# Patient Record
Sex: FEMALE | Race: WHITE | NOT HISPANIC OR LATINO | URBAN - METROPOLITAN AREA
[De-identification: names, ages, dates, MRNs, and addresses within clinical notes are randomized per-mention and may not be internally consistent; named-entity substitution may affect disease eponyms.]

---

## 2018-05-18 ENCOUNTER — EMERGENCY (EMERGENCY)
Facility: HOSPITAL | Age: 63
LOS: 1 days | Discharge: HOME | End: 2018-05-18
Admitting: EMERGENCY MEDICINE
Payer: SUBSIDIZED

## 2018-05-18 VITALS
SYSTOLIC BLOOD PRESSURE: 194 MMHG | HEART RATE: 81 BPM | TEMPERATURE: 98 F | DIASTOLIC BLOOD PRESSURE: 86 MMHG | OXYGEN SATURATION: 99 % | RESPIRATION RATE: 20 BRPM

## 2018-05-18 DIAGNOSIS — M71.22 SYNOVIAL CYST OF POPLITEAL SPACE [BAKER], LEFT KNEE: ICD-10-CM

## 2018-05-18 DIAGNOSIS — M25.562 PAIN IN LEFT KNEE: ICD-10-CM

## 2018-05-18 PROCEDURE — 93970 EXTREMITY STUDY: CPT | Mod: 26

## 2018-05-19 VITALS
TEMPERATURE: 98 F | RESPIRATION RATE: 18 BRPM | HEART RATE: 87 BPM | DIASTOLIC BLOOD PRESSURE: 77 MMHG | SYSTOLIC BLOOD PRESSURE: 163 MMHG | OXYGEN SATURATION: 98 %

## 2018-05-19 NOTE — ED PROVIDER NOTE - NS ED ROS FT
Constitutional: no fever, chills, no recent weight loss, change in appetite or malaise  Eyes: no redness/discharge/pain/vision changes  ENT: no rhinorrhea/ear pain/sore throat  Cardiac: No chest pain, SOB or edema.  Respiratory: No cough or respiratory distress  GI: No nausea, vomiting, diarrhea or abdominal pain.  : No dysuria, frequency, urgency or hematuria  MS: no pain to back or other extremities, no loss of ROM, no weakness  Neuro: No headache or weakness. No LOC.  Skin: No skin rash.  Except as documented in the HPI, all other systems are negative.

## 2018-05-19 NOTE — ED PROVIDER NOTE - OBJECTIVE STATEMENT
pt went to UC for eval of atx knee pain, dx with bakers cyst  no imaging performed, not given any ace wrap/knee immobilizer etc  presents for second opinion as pt having pain with weight bearing/ambulation  no other sxs

## 2018-05-19 NOTE — ED PROVIDER NOTE - PROGRESS NOTE DETAILS
Pt aware that we will have official radiology read pending, and may result in change of xray read.  Pt voices understanding of use of medications, instructions for care, and reasons to return or to go to ED  cannot r/o acute fx, although c/w deg changes  Discussed rest, ice, compression, and elevation with patient.   Discussed NSAIDs for anti-inflammatory and pain relief.  Patient advised to f/u with ortho doppler neg

## 2020-09-10 ENCOUNTER — EMERGENCY (EMERGENCY)
Facility: HOSPITAL | Age: 65
LOS: 0 days | Discharge: HOME | End: 2020-09-10
Attending: STUDENT IN AN ORGANIZED HEALTH CARE EDUCATION/TRAINING PROGRAM | Admitting: STUDENT IN AN ORGANIZED HEALTH CARE EDUCATION/TRAINING PROGRAM
Payer: COMMERCIAL

## 2020-09-10 VITALS
TEMPERATURE: 98 F | HEART RATE: 77 BPM | OXYGEN SATURATION: 98 % | SYSTOLIC BLOOD PRESSURE: 141 MMHG | DIASTOLIC BLOOD PRESSURE: 77 MMHG | RESPIRATION RATE: 20 BRPM

## 2020-09-10 PROCEDURE — 99053 MED SERV 10PM-8AM 24 HR FAC: CPT

## 2020-09-10 PROCEDURE — 71046 X-RAY EXAM CHEST 2 VIEWS: CPT | Mod: 26

## 2020-09-10 PROCEDURE — 73030 X-RAY EXAM OF SHOULDER: CPT | Mod: 26,50

## 2020-09-10 PROCEDURE — 99284 EMERGENCY DEPT VISIT MOD MDM: CPT

## 2020-09-10 RX ORDER — METHOCARBAMOL 500 MG/1
1500 TABLET, FILM COATED ORAL ONCE
Refills: 0 | Status: COMPLETED | OUTPATIENT
Start: 2020-09-10 | End: 2020-09-10

## 2020-09-10 RX ORDER — IBUPROFEN 200 MG
600 TABLET ORAL ONCE
Refills: 0 | Status: COMPLETED | OUTPATIENT
Start: 2020-09-10 | End: 2020-09-10

## 2020-09-10 RX ADMIN — METHOCARBAMOL 1500 MILLIGRAM(S): 500 TABLET, FILM COATED ORAL at 13:47

## 2020-09-10 RX ADMIN — Medication 600 MILLIGRAM(S): at 13:48

## 2020-09-10 NOTE — ED PROVIDER NOTE - PHYSICAL EXAMINATION
Constitutional: Well developed, well nourished. NAD  TRAUMA: ABC intact. GCS 15. FAST negative.  Head: Normocephalic, atraumatic.  Eyes: PERRL. EOMI. No Raccoon eyes.   ENT: No nasal discharge. No septal hematoma. No Lauren sign. Mucous membranes moist.  Neck: Supple. Painless ROM. No midline tenderness, stepoffs.  Cardiovascular: Normal S1, S2. Regular rate and rhythm. No murmurs, rubs, or gallops.  Pulmonary: Normal respiratory rate and effort. Lungs clear to auscultation bilaterally. No wheezing, rales, or rhonchi.  CHEST: No chest wall tenderness, crepitus.  Abdominal: Soft. Nondistended. Nontender. No rebound, guarding, rigidity.  BACK: No midline T/L/S tenderness, stepoffs. No saddle paresthesia.  Extremities. Pelvis stable. No traumatic deformities, tenderness of extremities.  Skin: No rashes, cyanosis, lacerations, abrasions.  Neuro: AAOx3. Strength 5/5 in all extremities. Sensation intact throughout. No focal neurological deficits.  Psych: Normal mood. Normal affect.
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
## 2020-09-10 NOTE — ED PROVIDER NOTE - CLINICAL SUMMARY MEDICAL DECISION MAKING FREE TEXT BOX
imaging negative. pt well appearing, FROM to shoulders. str/sensation full to upper extremities. neck supple w/ FROM and no midline ttp. stable for dc w/ supportive care

## 2020-09-10 NOTE — ED PROVIDER NOTE - NS ED ROS FT
Constitutional: No altered mental status.  Eyes: No visual changes.  ENT: No hearing loss.  Neck: +neck pain. no stiffness.  Cardiovascular: No chest pain, palpitations.  Pulmonary: No SOB. No hemoptysis.  Abdominal: No abdominal pain, nausea, vomiting.   : No hematuria.  Neuro: No headache, syncope, dizziness.  MS: No back pain. No deformities.  Psych: No suicidal ideations.

## 2020-09-10 NOTE — ED PROVIDER NOTE - NSFOLLOWUPCLINICS_GEN_ALL_ED_FT
[facility] Concussion Program  Concussion Program  [address] [place] [address]   Phone: [phone]  Fax:   Follow Up Time:

## 2020-09-10 NOTE — ED PROVIDER NOTE - PATIENT PORTAL LINK FT
You can access the FollowMyHealth Patient Portal offered by [facility] by registering at the following website: [email or web address]. By joining [email or web address]’s FollowMyHealth portal, you will also be able to view your health information using other applications (apps) compatible with our system.
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
## 2022-09-09 ENCOUNTER — INPATIENT (INPATIENT)
Facility: HOSPITAL | Age: 67
LOS: 2 days | Discharge: HOME | End: 2022-09-12
Attending: STUDENT IN AN ORGANIZED HEALTH CARE EDUCATION/TRAINING PROGRAM | Admitting: STUDENT IN AN ORGANIZED HEALTH CARE EDUCATION/TRAINING PROGRAM
Payer: MEDICARE

## 2022-09-09 VITALS
RESPIRATION RATE: 18 BRPM | TEMPERATURE: 98 F | WEIGHT: 259.93 LBS | DIASTOLIC BLOOD PRESSURE: 101 MMHG | OXYGEN SATURATION: 99 % | HEART RATE: 91 BPM | SYSTOLIC BLOOD PRESSURE: 183 MMHG

## 2022-09-09 LAB
ALBUMIN SERPL ELPH-MCNC: 4.8 G/DL — SIGNIFICANT CHANGE UP (ref 3.5–5.2)
ALP SERPL-CCNC: 137 U/L — HIGH (ref 30–115)
ALT FLD-CCNC: 31 U/L — SIGNIFICANT CHANGE UP (ref 0–41)
ANION GAP SERPL CALC-SCNC: 12 MMOL/L — SIGNIFICANT CHANGE UP (ref 7–14)
APTT BLD: 32.8 SEC — SIGNIFICANT CHANGE UP (ref 27–39.2)
AST SERPL-CCNC: 26 U/L — SIGNIFICANT CHANGE UP (ref 0–41)
BASOPHILS # BLD AUTO: 0.03 K/UL — SIGNIFICANT CHANGE UP (ref 0–0.2)
BASOPHILS NFR BLD AUTO: 0.5 % — SIGNIFICANT CHANGE UP (ref 0–1)
BILIRUB SERPL-MCNC: 0.6 MG/DL — SIGNIFICANT CHANGE UP (ref 0.2–1.2)
BUN SERPL-MCNC: 10 MG/DL — SIGNIFICANT CHANGE UP (ref 10–20)
CALCIUM SERPL-MCNC: 9.5 MG/DL — SIGNIFICANT CHANGE UP (ref 8.5–10.1)
CHLORIDE SERPL-SCNC: 104 MMOL/L — SIGNIFICANT CHANGE UP (ref 98–110)
CO2 SERPL-SCNC: 24 MMOL/L — SIGNIFICANT CHANGE UP (ref 17–32)
CREAT SERPL-MCNC: 0.6 MG/DL — LOW (ref 0.7–1.5)
EGFR: 99 ML/MIN/1.73M2 — SIGNIFICANT CHANGE UP
EOSINOPHIL # BLD AUTO: 0.06 K/UL — SIGNIFICANT CHANGE UP (ref 0–0.7)
EOSINOPHIL NFR BLD AUTO: 1 % — SIGNIFICANT CHANGE UP (ref 0–8)
GLUCOSE SERPL-MCNC: 108 MG/DL — HIGH (ref 70–99)
HCT VFR BLD CALC: 42 % — SIGNIFICANT CHANGE UP (ref 37–47)
HGB BLD-MCNC: 14.2 G/DL — SIGNIFICANT CHANGE UP (ref 12–16)
IMM GRANULOCYTES NFR BLD AUTO: 0.5 % — HIGH (ref 0.1–0.3)
INR BLD: 0.97 RATIO — SIGNIFICANT CHANGE UP (ref 0.65–1.3)
LYMPHOCYTES # BLD AUTO: 2.86 K/UL — SIGNIFICANT CHANGE UP (ref 1.2–3.4)
LYMPHOCYTES # BLD AUTO: 48.1 % — SIGNIFICANT CHANGE UP (ref 20.5–51.1)
MCHC RBC-ENTMCNC: 29 PG — SIGNIFICANT CHANGE UP (ref 27–31)
MCHC RBC-ENTMCNC: 33.8 G/DL — SIGNIFICANT CHANGE UP (ref 32–37)
MCV RBC AUTO: 85.7 FL — SIGNIFICANT CHANGE UP (ref 81–99)
MONOCYTES # BLD AUTO: 0.52 K/UL — SIGNIFICANT CHANGE UP (ref 0.1–0.6)
MONOCYTES NFR BLD AUTO: 8.8 % — SIGNIFICANT CHANGE UP (ref 1.7–9.3)
NEUTROPHILS # BLD AUTO: 2.44 K/UL — SIGNIFICANT CHANGE UP (ref 1.4–6.5)
NEUTROPHILS NFR BLD AUTO: 41.1 % — LOW (ref 42.2–75.2)
NRBC # BLD: 0 /100 WBCS — SIGNIFICANT CHANGE UP (ref 0–0)
PLATELET # BLD AUTO: 246 K/UL — SIGNIFICANT CHANGE UP (ref 130–400)
POTASSIUM SERPL-MCNC: 4 MMOL/L — SIGNIFICANT CHANGE UP (ref 3.5–5)
POTASSIUM SERPL-SCNC: 4 MMOL/L — SIGNIFICANT CHANGE UP (ref 3.5–5)
PROT SERPL-MCNC: 7.1 G/DL — SIGNIFICANT CHANGE UP (ref 6–8)
PROTHROM AB SERPL-ACNC: 11.2 SEC — SIGNIFICANT CHANGE UP (ref 9.95–12.87)
RBC # BLD: 4.9 M/UL — SIGNIFICANT CHANGE UP (ref 4.2–5.4)
RBC # FLD: 13.2 % — SIGNIFICANT CHANGE UP (ref 11.5–14.5)
SARS-COV-2 RNA SPEC QL NAA+PROBE: SIGNIFICANT CHANGE UP
SODIUM SERPL-SCNC: 140 MMOL/L — SIGNIFICANT CHANGE UP (ref 135–146)
TROPONIN T SERPL-MCNC: <0.01 NG/ML — SIGNIFICANT CHANGE UP
WBC # BLD: 5.94 K/UL — SIGNIFICANT CHANGE UP (ref 4.8–10.8)
WBC # FLD AUTO: 5.94 K/UL — SIGNIFICANT CHANGE UP (ref 4.8–10.8)

## 2022-09-09 PROCEDURE — 0042T: CPT | Mod: MA

## 2022-09-09 PROCEDURE — 93010 ELECTROCARDIOGRAM REPORT: CPT

## 2022-09-09 PROCEDURE — 70450 CT HEAD/BRAIN W/O DYE: CPT | Mod: 26,MA,59

## 2022-09-09 PROCEDURE — 70496 CT ANGIOGRAPHY HEAD: CPT | Mod: 26,MA

## 2022-09-09 PROCEDURE — 70498 CT ANGIOGRAPHY NECK: CPT | Mod: 26,MA

## 2022-09-09 PROCEDURE — 99285 EMERGENCY DEPT VISIT HI MDM: CPT | Mod: GC

## 2022-09-09 RX ORDER — ASPIRIN/CALCIUM CARB/MAGNESIUM 324 MG
325 TABLET ORAL ONCE
Refills: 0 | Status: COMPLETED | OUTPATIENT
Start: 2022-09-09 | End: 2022-09-09

## 2022-09-09 RX ORDER — CLOPIDOGREL BISULFATE 75 MG/1
600 TABLET, FILM COATED ORAL ONCE
Refills: 0 | Status: COMPLETED | OUTPATIENT
Start: 2022-09-09 | End: 2022-09-09

## 2022-09-09 RX ADMIN — Medication 325 MILLIGRAM(S): at 21:03

## 2022-09-09 RX ADMIN — CLOPIDOGREL BISULFATE 600 MILLIGRAM(S): 75 TABLET, FILM COATED ORAL at 21:03

## 2022-09-09 NOTE — ED PROVIDER NOTE - CARE PLAN
1 Principal Discharge DX:	Facial droop due to acute stroke  Secondary Diagnosis:	Atrial fibrillation

## 2022-09-09 NOTE — ED PROVIDER NOTE - OBJECTIVE STATEMENT
left facial droop 11am 67yoF PMHx HTN, noticed left lower facial droop, numbness since 11am while at work. 68 y/o F with PMH hyperthyroidism presenting with left-sided facial droop and numbness to left lower lip since 11am today while at work. Pt states she noticed this because it felt like her lip was being pulled. Denies headache, dizziness, 66 y/o F with PMH hyperthyroidism presenting with left-sided facial droop and numbness to left lower lip since 11am today while at work. Pt states she noticed this because it felt like her lip was being pulled. Denies headache, dizziness, vision changes, chest pain, SOB

## 2022-09-09 NOTE — ED PROVIDER NOTE - CLINICAL SUMMARY MEDICAL DECISION MAKING FREE TEXT BOX
67-year-old female past medical history hypothyroidism presents to the ER for right lower droop that started at 11 AM while at work. NIH 2 on arrival, not a tpa candidate. EKG shows new AF with nvr. Labs and imaging reviewed. Patient updated at the bedside. Admitted to stroke unit for further workup, freq neuro checks.

## 2022-09-09 NOTE — PATIENT PROFILE ADULT - FALL HARM RISK - HARM RISK INTERVENTIONS

## 2022-09-09 NOTE — ED PROVIDER NOTE - ATTENDING CONTRIBUTION TO CARE
67-year-old female past medical history hypothyroidism presents to the ER for right lower droop that started at 11 AM while at work.  She finished her job and came home and symptoms have not resolved. She also reports numbness on her right lower face next to her lower lip.  Denies visual changes, and family denies dysarthria, aphasia.  She denies headache, other focal weakness or numbness.    vs noted, triage note reviewed    CONSTITUTIONAL: Well-developed; well-nourished; in no acute distress. Sitting up and providing appropriate history and physical examination  SKIN: skin exam is warm and dry, no acute rash.  HEAD: Normocephalic; atraumatic.  EYES: PERRL, 3 mm bilateral, no nystagmus, EOM intact; conjunctiva and sclera clear.  ENT: No nasal discharge; airway clear.  NECK: Supple; non tender. + full passive ROM in all directions. No JVD  CARD: S1, S2 normal; no murmurs, gallops, or rubs. Regular rate and rhythm. + Symmetric Strong Pulses  RESP: No wheezes, rales or rhonchi. Good air movement bilaterally  ABD: soft; non-distended; non-tender. No Rebound, No Guarding, No signs of peritonitis, No CVA tenderness. No pulsatile abdominal mass. + Strong and Symmetric Pulses  EXT: Normal ROM. No clubbing, cyanosis or edema. Dp and Pt Pulses intact. Cap refill less than 3 seconds  NEURO: R lower facial droop, + numbness to R lower lip region, normal finger to nose, stable gait, no sensory or motor deficits, Alert, oriented, grossly unremarkable. No Focal deficits. GCS 15. NIH 2  PSYCH: Cooperative, appropriate.    a/p:  R lower facial droop + numbness  HCT, CTA head/neck, CTP  labs, neuro eval  reassess

## 2022-09-09 NOTE — CONSULT NOTE ADULT - SUBJECTIVE AND OBJECTIVE BOX
CC: Left facial droop       HPI:  66 yo RHF with pmhx of Hyperthyroidism , mrankin score of 0 at baseline, p.w c/o left lower facial droop and numbness since 11am today. Patient states that she was at work when she noted the symptom but continued to work and then decided to come to ED now since her symptom persisted. She denies any ear pain, foreign-body sensation in her eye, loss of taste , fever chills, dizziness, speech deficits , dysphagia or focal extremity weakness and no previous h/o stroke.      Home Medications:  Methimazole 10mg q 24    Social History  Denies smoking, alcohol or drug abuse   lives with family        Neuro Exam:  Vital signs: Bp 183/101  HR: 91, Temp 98.5 RR 18  02 Sat 98% on ra,  General: Pleasant female, lying in the bed in no acute distress.  Mental status: A/O X3 . Speech without dysarthria, intact  naming comprehension and reading  Attention: normal concentrating ability.  Language: no difficulty naming common objects, no difficulty repeating a phrase, comprehension intact.   Fund of knowledge: Able to give personal and pmhx  Cranial Nerves: Pupils 3mm ou brisk response to light, EOMI, VFF, Left lower facial weakness, facial sensation is intact bilaterally, forehead looks symmetric at this time, normal eyelid closure and strength, tongue protrudes midline, normal shoulder shrug  Motor: 5/5 throughout             Normal muscle tone and bulk             No Abnormal mvmts   Sensory exam: Intact and symmetric  Coordination:. no dysmetria or limb ataxia  Station: No pronator drift or   Gait: steady     NIHSS: 1  Loc 0  commands 0  questions 0  VF 0  Gaze 0  Face 1  RUE 0  RLE 0  LUE 0  LLE 0  Sensory 0  Speech 0  Language 0  Ataxia 0  Extinction 0    mRankin score 0  0 No symptoms at all  1 No significant disability despite symptoms; able to carry out all usual duties and activities without assistance  2 Slight disability; unable to carry out all previous activities, but able to look after own affairs  3 Moderate disability; requiring some help, but able to walk without assistance  4 Moderately severe disability; unable to walk without assistance and unable to attend to own bodily needs without assistance  5 Severe disability; bedridden, incontinent and requiring constant nursing care and attention  6 Dead      Allergies    No Known Allergies    Intolerances      MEDICATIONS  (STANDING):    MEDICATIONS  (PRN):      LABS:                        14.2   5.94  )-----------( 246      ( 09 Sep 2022 19:44 )             42.0     09-09    140  |  104  |  10  ----------------------------<  108<H>  4.0   |  24  |  0.6<L>    Ca    9.5      09 Sep 2022 19:44    TPro  7.1  /  Alb  4.8  /  TBili  0.6  /  DBili  x   /  AST  26  /  ALT  31  /  AlkPhos  137<H>  09-09    `      Neuro Imaging:  `< from: CT Brain Stroke Protocol (09.09.22 @ 19:19) >  FINDINGS:    There is prominence of the bilateral ventricles, sylvian fissures, and   sulci, compatible with mild diffuse parenchymal volume loss.    There are patchy hypodensities in the periventricular white matter,   likely representing mild chronic microvascular ischemic changes.    There is no evidence of acute territorial infarct or intracranial   hemorrhage. There is no space-occupying lesion or midline shift.    There is no evidence of hydrocephalus. There are no extra-axial fluid   collections.    The visualized intraorbital contents are normal. Near complete   opacification of the left maxillary sinus.The mastoid air cells are   aerated. The visualized soft tissues and osseous structures appear normal.      IMPRESSION:    No evidence of acute intracranial pathology.    Mild chronic microvascular ischemic changes.    Preliminary findings were discussed with Marci Cook on 9/9/2022 at   7:35 PM.    --- End of Report ---    MARC GERARDO MD; Resident Radiologist  This document has been electronically signed.  LIBBY BURRELL MD; Attending Radiologist  This document has been electronically signed. Sep  9 2022  7:52PM    < end of copied text >          < from: CT Angio Neck Stroke Protocol w/ IV Cont (09.09.22 @ 19:43) >  IMPRESSION:    CT PERFUSION:  No perfusion deficits to suggest areas of completed infarction or at risk   territory.      CTA HEAD/NECK:  No large vessel occlusion or vascular malformation.    Incidental inferiorly projecting 2 mm vascular outpouching in the right   carotid terminus could reflect a vascular infundibulum versus aneurysm.    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    --- End of Report ---    LIBBY BURRELL MD; Attending Radiologist  This document has been electronically signed. Sep  9 2022  8:01PM    < end of copied text >        EEG:     Echo:   Carotid Doppler: N/A  Telemetry:

## 2022-09-09 NOTE — ED PROVIDER NOTE - PHYSICAL EXAMINATION
CONSTITUTIONAL: awake, sitting in stretcher in no acute distress  SKIN: Warm, dry  HEAD: Normocephalic; atraumatic  EYES: No conjunctival injection. EOMI. PERRL  ENT: No nasal discharge; oropharynx nonerythematous; airway clear  NECK: Supple; non tender, no lymphadenopathy  CARD:  Regular rate and rhythm; S1, S2 normal; no murmurs, gallops, or rubs  RESP: CTAB; No wheezes, crackles, rales or rhonchi  ABD: Soft, nontender, nondistended, nonrigid, no guarding or rebound tenderness  EXT: Normal ROM,  no edema, good radial pulse  NEURO: AOx3, speaking in full sentences with mildly slurred speech, right-sided facial droop, peripheral vision intact, No pronator drift. Strength and sensation intact and symmetric in all extremities. Finger to nose and heel to shin WNL. CONSTITUTIONAL: awake, sitting in stretcher in no acute distress  SKIN: Warm, dry  HEAD: Normocephalic; atraumatic  EYES: No conjunctival injection. EOMI. PERRL  ENT: No nasal discharge; oropharynx nonerythematous; airway clear  NECK: Supple; non tender, no lymphadenopathy  CARD:  Regular rate and rhythm; S1, S2 normal; no murmurs, gallops, or rubs  RESP: CTAB; No wheezes, crackles, rales or rhonchi  ABD: Soft, nontender, nondistended, nonrigid, no guarding or rebound tenderness  EXT: Normal ROM,  no edema, good radial pulse  NEURO: AOx3, speaking in full sentences with mildly slurred speech, left-sided facial droop, does not involve forehead, peripheral vision intact, No pronator drift. Strength and sensation intact and symmetric in all extremities; decreased sensation at left lower lip. Finger to nose and heel to shin WNL.

## 2022-09-09 NOTE — ED PROVIDER NOTE - NS ED ROS FT
Review of Systems:  CONSTITUTIONAL: (-)fever, (-)chills  SKIN: (-)rash  EYES: (-) eye pain, (-) vision changes  ENT: (-) rhinorrhea, (-) congestion, (-) sore throat  RESPIRATORY: (-) shortness of breath, (-) cough  CARDIAC: (-) chest pain, (-) palpitations  GI: (-) abdominal pain, (-) nausea, (-) vomiting, (-) diarrhea, (-) constipation, (-) melena (-) hematochezia  : (-) dysuria, (-) frequency, (-) hematuria  NEUROLOGIC: (-) numbness or tingling, (-) focal weakness, (-) headache, (-) dizziness  All other systems negative, unless specified in HPI

## 2022-09-09 NOTE — ED PROVIDER NOTE - PROGRESS NOTE DETAILS
JOHNNY -- EKG shows Atrial fibrillation at 93bpm. Patient and family deny any prior diagnosis of Afib, patient has never seen a cardiologist. Call placed to cardiology, pending call back.

## 2022-09-09 NOTE — CONSULT NOTE ADULT - ASSESSMENT
68 yo RHF with pmhx of Hyperthyroidism , mrankin score of 0 at baseline, p.w c/o left lower facial droop and numbness since 11am today.  She denies any ear pain, foreign-body sensation in her eye, loss of taste , fever chills, dizziness, speech deficits , dysphagia or focal extremity weakness and no previous h/o stroke.  NIHSS 1 for left facial asymmetry. /101  FS `108. ED team discussed the case with tele stroke attending on call Dr Gabriel Boudreaux. CTH is negative for acute intracranial findings /bleed. Patient is currently out of the therapeutic window for IV Thrombolysis. Brain Vessel imaging is negative for LVO or perfusion deficit, but has an incidental finding of  2 mm vascular outpouching in the right carotid terminus could be a vascular infundibulum vs aneurysm.       Plan  ·	Admit patient to stroke unit to Dr Kari Bradshaw  ·	Continuos Telemonitoring  ·	NIHSS and vital signs q 4 hrs  ·	Administer Asa 325mg po x1 now  ·	Start asa 81 mg q 24  ·	Start Lipitor 80 mg q 24  ·	Obtain MRI brain n/c   ·	Check lipid profile, hbaic, EKG  ·	ECHO with bubble study  ·	Dysphagia screen (keep npo until patient passes the test)  ·	DVT prophylaxis  ·	Neuro attending note will follow     68 yo RHF with pmhx of Hyperthyroidism , mrankin score of 0 at baseline, p.w c/o left lower facial droop and numbness since 11am today.  She denies any ear pain, foreign-body sensation in her eye, loss of taste , fever chills, dizziness, speech deficits , dysphagia or focal extremity weakness and no previous h/o stroke.  NIHSS 1 for left facial asymmetry. /101  FS `108. ED team discussed the case with tele stroke attending on call Dr Gabriel Boudreaux. CTH is negative for acute intracranial findings /bleed. Patient is currently out of the therapeutic window for IV Thrombolysis. Brain Vessel imaging is negative for LVO or perfusion deficit, but has an incidental finding of  2 mm vascular outpouching in the right carotid terminus could be a vascular infundibulum vs aneurysm.       Plan  ·	Admit patient to stroke unit to Dr Kari Bradshaw  ·	Continuos Telemonitoring  ·	NIHSS and vital signs q 4 hrs  ·	Administer Asa 325mg po x1 now  ·	Start asa 81 mg q 24  ·	Start Lipitor 80 mg q 24  ·	Obtain MRI brain n/c   ·	Check lipid profile, hbaic, EKG  ·	ECHO with bubble study  ·	Cardio evaluation for newonset afib  ·	DASH diet (patient passed dysphagia screen)  ·	DVT prophylaxis  ·	Neuro attending note will follow     68 yo RHF with pmhx of Hyperthyroidism , mrankin score of 0 at baseline, p.w c/o left lower facial droop and numbness since 11am today.  She denies any ear pain, foreign-body sensation in her eye, loss of taste , fever chills, dizziness, speech deficits , dysphagia or focal extremity weakness and no previous h/o stroke.  NIHSS 1 for left facial asymmetry. /101  FS `108. ED team discussed the case with tele stroke attending on call Dr Gabriel Boudreaux. CTH is negative for acute intracranial findings /bleed. Patient is currently out of the therapeutic window for IV Thrombolysis. Brain Vessel imaging is negative for LVO or perfusion deficit, but has an incidental finding of  2 mm vascular outpouching in the right carotid terminus could be a vascular infundibulum vs aneurysm.       Plan  ·	Admit patient to stroke unit to Dr Kari Bradshaw  ·	Continuos Telemonitoring  ·	NIHSS and vital signs q 4 hrs  ·	Permissive HTN for now  ·	Administer Asa 325mg po x1 now  ·	Start asa 81 mg q 24  ·	Start Lipitor 80 mg q 24  ·	Obtain MRI brain n/c   ·	Check lipid profile, hbaic, EKG  ·	ECHO with bubble study  ·	Cardio evaluation for newonset afib  ·	DASH diet (patient passed dysphagia screen)  ·	DVT prophylaxis  ·	Neuro attending note will follow     68 yo RHF with pmhx of Hyperthyroidism , mrankin score of 0 at baseline, p.w c/o left lower facial droop and numbness since 11am today.  She denies any ear pain, foreign-body sensation in her eye, loss of taste , fever chills, dizziness, speech deficits , dysphagia or focal extremity weakness and no previous h/o stroke.  NIHSS 1 for left facial asymmetry. /101  FS `108. ED team discussed the case with tele stroke attending on call Dr Gabriel Boudreaux. CTH is negative for acute intracranial findings /bleed. Patient is currently out of the therapeutic window for IV Thrombolysis. Brain Vessel imaging is negative for LVO or perfusion deficit, but has an incidental finding of  2 mm vascular outpouching in the right carotid terminus could be a vascular infundibulum vs aneurysm.       Plan  ·	Admit patient to stroke unit to Dr Kari Bradshaw  ·	Continuos Telemonitoring  ·	NIHSS and vital signs q 4 hrs  ·	Keep sbp 140-180  ·	Administer Asa 325mg + Plavix 75mg po x1   ·	Start Lipitor 80 mg q 24  ·	Obtain MRI brain n/c   ·	Check lipid profile, hbaic, EKG  ·	ECHO with bubble study  ·	Cardio evaluation for new onset afib  ·	DASH diet (patient passed dysphagia screen)  ·	DVT prophylaxis  ·	Neuro attending note will follow     68 yo RHF with pmhx of Hyperthyroidism , mrankin score of 0 at baseline, p.w c/o left lower facial droop and numbness since 11am today.  She denies any ear pain, foreign-body sensation in her eye, loss of taste , fever chills, dizziness, speech deficits , dysphagia or focal extremity weakness and no previous h/o stroke.  NIHSS 1 for left facial asymmetry. /101  FS `108. ED team discussed the case with tele stroke attending on call Dr Gabriel Boudreaux. CTH is negative for acute intracranial findings /bleed. Patient is currently out of the therapeutic window for IV Thrombolysis. Brain Vessel imaging is negative for LVO or perfusion deficit, but has an incidental finding of  2 mm vascular outpouching in the right carotid terminus could be a vascular infundibulum vs aneurysm.       Plan  ·	Admit patient to stroke unit to Dr Kari Bradshaw  ·	Continuos Telemonitoring  ·	NIHSS and vital signs q 4 hrs  ·	Keep sbp 120-160  ·	Administer Asa 325mg + Plavix 75mg po x1 (completed in ed)  ·	Start Lipitor 80 mg q 24  ·	Obtain MRI brain n/c   ·	Check lipid profile, hbaic, EKG  ·	ECHO with bubble study  ·	Cardio evaluation for new onset afib  ·	DASH diet (patient passed dysphagia screen)  ·	DVT prophylaxis  ·	Neuro attending note will follow

## 2022-09-10 LAB
A1C WITH ESTIMATED AVERAGE GLUCOSE RESULT: 6.2 % — HIGH (ref 4–5.6)
ALBUMIN SERPL ELPH-MCNC: 4.2 G/DL — SIGNIFICANT CHANGE UP (ref 3.5–5.2)
ALP SERPL-CCNC: 125 U/L — HIGH (ref 30–115)
ALT FLD-CCNC: 27 U/L — SIGNIFICANT CHANGE UP (ref 0–41)
ANION GAP SERPL CALC-SCNC: 13 MMOL/L — SIGNIFICANT CHANGE UP (ref 7–14)
APTT BLD: 45.2 SEC — HIGH (ref 27–39.2)
APTT BLD: 46.3 SEC — HIGH (ref 27–39.2)
APTT BLD: 49.3 SEC — HIGH (ref 27–39.2)
APTT BLD: 58.5 SEC — HIGH (ref 27–39.2)
AST SERPL-CCNC: 25 U/L — SIGNIFICANT CHANGE UP (ref 0–41)
BILIRUB SERPL-MCNC: 0.9 MG/DL — SIGNIFICANT CHANGE UP (ref 0.2–1.2)
BUN SERPL-MCNC: 9 MG/DL — LOW (ref 10–20)
CALCIUM SERPL-MCNC: 9.2 MG/DL — SIGNIFICANT CHANGE UP (ref 8.5–10.1)
CHLORIDE SERPL-SCNC: 106 MMOL/L — SIGNIFICANT CHANGE UP (ref 98–110)
CHOLEST SERPL-MCNC: 201 MG/DL — HIGH
CO2 SERPL-SCNC: 21 MMOL/L — SIGNIFICANT CHANGE UP (ref 17–32)
CREAT SERPL-MCNC: 0.5 MG/DL — LOW (ref 0.7–1.5)
EGFR: 103 ML/MIN/1.73M2 — SIGNIFICANT CHANGE UP
ESTIMATED AVERAGE GLUCOSE: 131 MG/DL — HIGH (ref 68–114)
GLUCOSE SERPL-MCNC: 124 MG/DL — HIGH (ref 70–99)
HCT VFR BLD CALC: 39.3 % — SIGNIFICANT CHANGE UP (ref 37–47)
HCT VFR BLD CALC: 43.8 % — SIGNIFICANT CHANGE UP (ref 37–47)
HDLC SERPL-MCNC: 44 MG/DL — LOW
HGB BLD-MCNC: 13.6 G/DL — SIGNIFICANT CHANGE UP (ref 12–16)
HGB BLD-MCNC: 14.7 G/DL — SIGNIFICANT CHANGE UP (ref 12–16)
INR BLD: 0.97 RATIO — SIGNIFICANT CHANGE UP (ref 0.65–1.3)
LIPID PNL WITH DIRECT LDL SERPL: 132 MG/DL — HIGH
MAGNESIUM SERPL-MCNC: 2 MG/DL — SIGNIFICANT CHANGE UP (ref 1.8–2.4)
MCHC RBC-ENTMCNC: 28.8 PG — SIGNIFICANT CHANGE UP (ref 27–31)
MCHC RBC-ENTMCNC: 29.4 PG — SIGNIFICANT CHANGE UP (ref 27–31)
MCHC RBC-ENTMCNC: 33.6 G/DL — SIGNIFICANT CHANGE UP (ref 32–37)
MCHC RBC-ENTMCNC: 34.6 G/DL — SIGNIFICANT CHANGE UP (ref 32–37)
MCV RBC AUTO: 85.1 FL — SIGNIFICANT CHANGE UP (ref 81–99)
MCV RBC AUTO: 85.7 FL — SIGNIFICANT CHANGE UP (ref 81–99)
NON HDL CHOLESTEROL: 157 MG/DL — HIGH
NRBC # BLD: 0 /100 WBCS — SIGNIFICANT CHANGE UP (ref 0–0)
NRBC # BLD: 0 /100 WBCS — SIGNIFICANT CHANGE UP (ref 0–0)
PHOSPHATE SERPL-MCNC: 3.8 MG/DL — SIGNIFICANT CHANGE UP (ref 2.1–4.9)
PLATELET # BLD AUTO: 230 K/UL — SIGNIFICANT CHANGE UP (ref 130–400)
PLATELET # BLD AUTO: 248 K/UL — SIGNIFICANT CHANGE UP (ref 130–400)
POTASSIUM SERPL-MCNC: 4.2 MMOL/L — SIGNIFICANT CHANGE UP (ref 3.5–5)
POTASSIUM SERPL-SCNC: 4.2 MMOL/L — SIGNIFICANT CHANGE UP (ref 3.5–5)
PROT SERPL-MCNC: 6.4 G/DL — SIGNIFICANT CHANGE UP (ref 6–8)
PROTHROM AB SERPL-ACNC: 11.2 SEC — SIGNIFICANT CHANGE UP (ref 9.95–12.87)
RBC # BLD: 4.62 M/UL — SIGNIFICANT CHANGE UP (ref 4.2–5.4)
RBC # BLD: 5.11 M/UL — SIGNIFICANT CHANGE UP (ref 4.2–5.4)
RBC # FLD: 13.1 % — SIGNIFICANT CHANGE UP (ref 11.5–14.5)
RBC # FLD: 13.1 % — SIGNIFICANT CHANGE UP (ref 11.5–14.5)
SODIUM SERPL-SCNC: 140 MMOL/L — SIGNIFICANT CHANGE UP (ref 135–146)
TRIGL SERPL-MCNC: 126 MG/DL — SIGNIFICANT CHANGE UP
TSH SERPL-MCNC: 5.46 UIU/ML — HIGH (ref 0.27–4.2)
WBC # BLD: 5.21 K/UL — SIGNIFICANT CHANGE UP (ref 4.8–10.8)
WBC # BLD: 5.35 K/UL — SIGNIFICANT CHANGE UP (ref 4.8–10.8)
WBC # FLD AUTO: 5.21 K/UL — SIGNIFICANT CHANGE UP (ref 4.8–10.8)
WBC # FLD AUTO: 5.35 K/UL — SIGNIFICANT CHANGE UP (ref 4.8–10.8)

## 2022-09-10 PROCEDURE — 99221 1ST HOSP IP/OBS SF/LOW 40: CPT

## 2022-09-10 PROCEDURE — 99222 1ST HOSP IP/OBS MODERATE 55: CPT

## 2022-09-10 RX ORDER — HEPARIN SODIUM 5000 [USP'U]/ML
INJECTION INTRAVENOUS; SUBCUTANEOUS
Qty: 25000 | Refills: 0 | Status: DISCONTINUED | OUTPATIENT
Start: 2022-09-10 | End: 2022-09-10

## 2022-09-10 RX ORDER — METHIMAZOLE 10 MG/1
1 TABLET ORAL
Qty: 0 | Refills: 0 | DISCHARGE

## 2022-09-10 RX ORDER — ASPIRIN/CALCIUM CARB/MAGNESIUM 324 MG
81 TABLET ORAL DAILY
Refills: 0 | Status: DISCONTINUED | OUTPATIENT
Start: 2022-09-10 | End: 2022-09-10

## 2022-09-10 RX ORDER — METOPROLOL TARTRATE 50 MG
2.5 TABLET ORAL EVERY 4 HOURS
Refills: 0 | Status: DISCONTINUED | OUTPATIENT
Start: 2022-09-10 | End: 2022-09-12

## 2022-09-10 RX ORDER — PANTOPRAZOLE SODIUM 20 MG/1
40 TABLET, DELAYED RELEASE ORAL
Refills: 0 | Status: DISCONTINUED | OUTPATIENT
Start: 2022-09-10 | End: 2022-09-12

## 2022-09-10 RX ORDER — CLOPIDOGREL BISULFATE 75 MG/1
75 TABLET, FILM COATED ORAL DAILY
Refills: 0 | Status: DISCONTINUED | OUTPATIENT
Start: 2022-09-10 | End: 2022-09-10

## 2022-09-10 RX ORDER — ATORVASTATIN CALCIUM 80 MG/1
80 TABLET, FILM COATED ORAL AT BEDTIME
Refills: 0 | Status: DISCONTINUED | OUTPATIENT
Start: 2022-09-10 | End: 2022-09-12

## 2022-09-10 RX ORDER — ACETAMINOPHEN 500 MG
650 TABLET ORAL EVERY 6 HOURS
Refills: 0 | Status: DISCONTINUED | OUTPATIENT
Start: 2022-09-10 | End: 2022-09-12

## 2022-09-10 RX ORDER — HEPARIN SODIUM 5000 [USP'U]/ML
1000 INJECTION INTRAVENOUS; SUBCUTANEOUS
Qty: 25000 | Refills: 0 | Status: DISCONTINUED | OUTPATIENT
Start: 2022-09-10 | End: 2022-09-12

## 2022-09-10 RX ORDER — LANOLIN ALCOHOL/MO/W.PET/CERES
3 CREAM (GRAM) TOPICAL AT BEDTIME
Refills: 0 | Status: DISCONTINUED | OUTPATIENT
Start: 2022-09-10 | End: 2022-09-12

## 2022-09-10 RX ORDER — ONDANSETRON 8 MG/1
4 TABLET, FILM COATED ORAL EVERY 8 HOURS
Refills: 0 | Status: DISCONTINUED | OUTPATIENT
Start: 2022-09-10 | End: 2022-09-12

## 2022-09-10 RX ADMIN — PANTOPRAZOLE SODIUM 40 MILLIGRAM(S): 20 TABLET, DELAYED RELEASE ORAL at 06:04

## 2022-09-10 RX ADMIN — ATORVASTATIN CALCIUM 80 MILLIGRAM(S): 80 TABLET, FILM COATED ORAL at 21:17

## 2022-09-10 RX ADMIN — HEPARIN SODIUM 1200 UNIT(S)/HR: 5000 INJECTION INTRAVENOUS; SUBCUTANEOUS at 04:44

## 2022-09-10 NOTE — H&P ADULT - ASSESSMENT
68 yo RHF with pmhx of Hyperthyroidism , mrankin score of 0 at baseline, presents with left lower facial droop and numbness since 11am today that occurred at work.     r/o stroke   - monitor on tele  - neuro check q 4Telemonitoring  - permissive htn Keep sbp 140-180  s/p Asa 325mg + Plavix 75mg po x1   - start on Lipitor 80 mg q 24  - continue aspiring and plavix   - order MRI brain n/c   - Check lipid profile, hbaic, EKG  - check ECHO with bubble study  - PT/OT    #New onset afib   - check thyroid panel and if abnormal may need readjustment of hyperthyroid medication  - check echo   - start heparin drip for anticoagulation   - May need SHELLEY/CV     #hyperthyroid  - continue methimazole      # GI PPx - Protonix  # DVT PPx - hep gtt   # Activity -  Increase as Tolerated  # Dispo -   Patient to be discharged when medically optimized.  # Code Status - FULL     66 yo RHF with pmhx of Hyperthyroidism , mrankin score of 0 at baseline, presents with left lower facial droop and numbness since 11am today that occurred at work.     r/o stroke   - monitor on tele  - neuro check q 4Telemonitoring  - permissive htn Keep sbp 140-180  s/p Asa 325mg + Plavix 75mg po x1   - start on Lipitor 80 mg q 24  - continue aspirin  - order MRI brain n/c   - Check lipid profile, hbaic, EKG  - check ECHO with bubble study  - PT/OT    #New onset afib   - check thyroid panel and if abnormal may need readjustment of hyperthyroid medication  - check echo   - start heparin drip for anticoagulation   - May need SHELLEY/CV     #hyperthyroid  - continue methimazole      # GI PPx - Protonix  # DVT PPx - hep gtt   # Activity -  Increase as Tolerated  # Dispo -   Patient to be discharged when medically optimized.  # Code Status - FULL     66 yo RHF with pmhx of Hyperthyroidism , mrankin score of 0 at baseline, presents with left lower facial droop and numbness since 11am today that occurred at work.     r/o stroke   - monitor on tele  - neuro check q 4Telemonitoring  - permissive htn Keep sbp 140-180  s/p Asa 325mg + Plavix 75mg po x1   - start on Lipitor 80 mg q 24  - started on hep gtt for stroke protocol  - order MRI brain n/c   - Check lipid profile, hbaic, EKG  - check ECHO with bubble study  - PT/OT    #New onset afib   - check thyroid panel and if abnormal may need readjustment of hyperthyroid medication  - check echo   - start heparin drip for anticoagulation   - May need SHELLEY/CV     #hyperthyroid  - continue methimazole      # GI PPx - Protonix  # DVT PPx - hep gtt   # Activity -  Increase as Tolerated  # Dispo -   Patient to be discharged when medically optimized.  # Code Status - FULL

## 2022-09-10 NOTE — PROGRESS NOTE ADULT - NS ATTEST RISK PROBLEM GEN_ALL_CORE FT
66 yo RHF with pmhx of Hyperthyroidism presented left lower facial droop and numbness. Brain Vessel imaging is negative for LVO or perfusion deficit and was out of tpa window. Incidentally found to have 2 mm vascular outpouching in the right carotid terminus could be a vascular infundibulum vs aneurysm. Found to have Afib on EKG, started on heparin full ac.     c/w neuro checks as above  MR brain non con  heparin for afib, rate control, echo  statin  tfts

## 2022-09-10 NOTE — PHYSICAL THERAPY INITIAL EVALUATION ADULT - GENERAL OBSERVATIONS, REHAB EVAL
8:55-9:25. chart reviewed. Pt received sitting at B/S EOB, alert, oriented, able to follow multi-step instructions and agreeable to PT evaluation.  + IV heparin, + monitoring, L side facial palsy, sensation intact, coordination intact, motor/balance WFL. No motor deficits was noted. Pt is independent with overall bed mobility and transfer, able to ambulate without AD, Pt is not candidate for PT at this time further PT referral PRN. 8:55-9:25. chart reviewed. Pt received sitting at B/S EOB, alert, oriented, able to follow multi-step instructions and agreeable to PT evaluation.  + IV heparin, + monitoring, L side complete facial palsy, sensation intact, coordination intact, motor/balance WFL. No motor deficits was noted. Pt is independent with overall bed mobility and transfer, able to ambulate without AD, Pt is not candidate for PT at this time further PT referral PRN.

## 2022-09-10 NOTE — CONSULT NOTE ADULT - ASSESSMENT
IMPRESSION  New Onset A Fib CHADSVASC 4  Left Sided Facial Droop upper motor neuron pattern  Admitted to Stroke Unit for CVA  Hyperthyroidism  DM    RECOMMENDATIONS  check 2 D echo  check TSH if abnormal consider endocrine eval for hyperthyroidism management  can start heparin drip for anticoagulation if neuro clears and if risk of hemorrhagic conversion of CVA is low  can switch to eliquis 5mg BID if tolerating heparin drip  aspirin 81 and lipitor 80 for CVA  check Lipid profile, HbA1c IMPRESSION  New Onset A Fib CHADSVASC 4  Left Sided Facial Droop upper motor neuron pattern  Admitted to Stroke Unit for CVA  Hyperthyroidism  DM    RECOMMENDATIONS  check 2 D echo  check TSH if abnormal consider endocrine eval for hyperthyroidism management  can start heparin drip for anticoagulation if neuro clears and if risk of hemorrhagic conversion of CVA is low  can switch to eliquis 5mg BID if tolerating heparin drip  aspirin 81 and lipitor 80 for CVA  check Lipid profile, HbA1c  May need SHELLEY/CV  IMPRESSION  New Onset A Fib CHADSVASC 4  Left Sided Facial Droop upper motor neuron pattern  Admitted to Stroke Unit for CVA  Hyperthyroidism  DM    RECOMMENDATIONS  check 2 D echo  check TSH if abnormal consider endocrine eval for hyperthyroidism management  can start heparin drip for anticoagulation if neuro clears and if risk of hemorrhagic conversion of CVA is low  can switch to eliquis 5mg BID if tolerating heparin drip  start metoprolol 12.5mg BID for rate control for now  aspirin 81 and lipitor 80 for CVA  check Lipid profile, HbA1c  May need SHELLEY/CV

## 2022-09-10 NOTE — H&P ADULT - HISTORY OF PRESENT ILLNESS
66 yo RHF with pmhx of Hyperthyroidism , mrankin score of 0 at baseline, presents with left lower facial droop and numbness since 11am today that occurred at work. She denies any ear pain, foreign-body sensation in her eye, loss of taste , fever chills, dizziness, speech deficits , dysphagia or focal extremity weakness and no previous h/o stroke.  Pt does endorse hx of palpitations that started today. EKG in the ED showed new onset afib

## 2022-09-10 NOTE — CONSULT NOTE ADULT - SUBJECTIVE AND OBJECTIVE BOX
HPI:  66 y/o F with PMHx of Hyperthyroidism on methimazole presented to the hospital for left sided facial droop with associated numbness. Pt was also found to have incidental finding of A Fib. Cardio consulted for A FIb.     Pt does endorse hx of palpitations that started today. Denied hx of chest pain, lightheadedness or dizziness. Never seen a cardiologist, denied prior hx of A FIb. No hx of bleeding.     PAST MEDICAL & SURGICAL HISTORY  No pertinent past medical history        FAMILY HISTORY:  FAMILY HISTORY:      SOCIAL HISTORY:  Social History:      ALLERGIES:  No Known Allergies      MEDICATIONS:  methimazole 10 milliGRAM(s) Oral daily    PRN:      HOME MEDICATIONS:  Home Medications:      VITALS:   T(F): 97.6 (09-09 @ 23:00), Max: 98.5 (09-09 @ 18:59)  HR: 82 (09-09 @ 23:55) (78 - 91)  BP: 178/104 (09-09 @ 23:55) (178/99 - 183/101)  BP(mean): 123 (09-09 @ 23:55) (123 - 123)  RR: 18 (09-09 @ 23:55) (18 - 18)  SpO2: 99% (09-09 @ 23:55) (99% - 99%)    I&O's Summary      REVIEW OF SYSTEMS:  CONSTITUTIONAL: see HPI  HEENT: No visual changes, neck/ear pain  RESPIRATORY: No cough, sob  CARDIOVASCULAR: See HPI  GASTROINTESTINAL: No abdominal pain. No nausea, vomiting, diarrhea   GENITOURINARY: No dysuria, frequency or hematuria  NEUROLOGICAL: Lt facial droop  SKIN: No new rashes    PHYSICAL EXAM:  General: Not in distress.     HEENT: EOMI  Cardio: Irregular, S1, S2, no murmur  Pulm: B/L BS.  No wheezing / crackles / rales  Abdomen: Soft, non-tender, non-distended. Normoactive bowel sounds  Extremities: No edema b/l le  Neuro: A&O x3. Left facial droop    LABS:                        14.2   5.94  )-----------( 246      ( 09 Sep 2022 19:44 )             42.0     09-09    140  |  104  |  10  ----------------------------<  108<H>  4.0   |  24  |  0.6<L>    Ca    9.5      09 Sep 2022 19:44    TPro  7.1  /  Alb  4.8  /  TBili  0.6  /  DBili  x   /  AST  26  /  ALT  31  /  AlkPhos  137<H>  09-09    PT/INR - ( 09 Sep 2022 19:44 )   PT: 11.20 sec;   INR: 0.97 ratio         PTT - ( 09 Sep 2022 19:44 )  PTT:32.8 sec  Troponin T, Serum: <0.01 ng/mL (09-09-22 @ 19:44)    CARDIAC MARKERS ( 09 Sep 2022 19:44 )  x     / <0.01 ng/mL / x     / x     / x            Troponin trend:        COVID-19 PCR: NotDetec (09 Sep 2022 20:13)      RADIOLOGY:  -CXR: < from: Xray Chest 2 Views PA/Lat (09.10.20 @ 14:58) >  Impression:    No radiographic evidence of acute cardiopulmonary disease.        < end of copied text >    -TTE: N/A  -STRESS TEST: N/A  -CATHETERIZATION: N/A  -OTHER:  ECG:  A FIb    TELEMETRY EVENTS: A Fib

## 2022-09-10 NOTE — PHYSICAL THERAPY INITIAL EVALUATION ADULT - LIVES WITH, PROFILE
Normocephalic. Eyes:      Extraocular Movements: Extraocular movements intact. Pupils: Pupils are equal, round, and reactive to light. Cardiovascular:      Rate and Rhythm: Normal rate. Pulmonary:      Effort: Pulmonary effort is normal. No respiratory distress. Abdominal:      General: Abdomen is flat. There is no distension. Tenderness: There is no abdominal tenderness. There is no guarding. Musculoskeletal:         General: Normal range of motion. Cervical back: Neck supple. Skin:     General: Skin is warm. Neurological:      Mental Status: He is alert. Assessment:   Hx perforated duodenal ulcer from H pylori  S/p julisa  Patch     Plan:   EGD today     I have discussed the risks, benefits, and alternatives to esophagogastroduodenoscopy with possible biopsy with deep sedation with the patient. I have detailed the risks of deep sedation (hypotension, hypoxia) as well as complications of bleeding and perforation.   The patient understands the above and agrees to proceed      Physician Signature: Trever Leach MD      Send copy of H&P to [address] [place] [address] DO Linda
DC instructions
in a PH using 10 steps to enter and 5 indoors/spouse

## 2022-09-10 NOTE — H&P ADULT - NSHPLABSRESULTS_GEN_ALL_CORE
LABS:  cret                        14.2   5.94  )-----------( 246      ( 09 Sep 2022 19:44 )             42.0     09-09    140  |  104  |  10  ----------------------------<  108<H>  4.0   |  24  |  0.6<L>    Ca    9.5      09 Sep 2022 19:44    TPro  7.1  /  Alb  4.8  /  TBili  0.6  /  DBili  x   /  AST  26  /  ALT  31  /  AlkPhos  137<H>  09-09    PT/INR - ( 09 Sep 2022 19:44 )   PT: 11.20 sec;   INR: 0.97 ratio         PTT - ( 09 Sep 2022 19:44 )  PTT:32.8 sec

## 2022-09-10 NOTE — PHYSICAL THERAPY INITIAL EVALUATION ADULT - PHYSICAL ASSIST/NONPHYSICAL ASSIST: GAIT, REHAB EVAL
[facility] Follow Up Note     Date: 5/11/2021 / Time: 2:43 PM    Patient ID:   Name:             Martha Ordonez   YOB: 1945  Age:                 75 y.o.  female   MRN:               [id number]      Thank you for requesting a sleep medicine consultation on Martha Ordonez at the sleep center. She presents today with the chief complaints of sleep apnea follow up.     HISTORY OF PRESENT ILLNESS:       Pt is currently on CPAP 6 cm with O2 bleed in at 2L/min. She was previously on ASV however she was unable to tolerate it. The patient denied significant change in medical or sleep history. She is getting about 8 hrs of sleep on a good night. The bad nights are rare per week.  Her mother passed away last Wednesday and since then her sleep is slightly more disturbed than usual.  However she has a good support system.  Overall,  She does finds her sleep refreshing.However she does take regular naps. The naps are usually 2 hrs long. She denies any symptoms of RLS, narcolepsy or any symptoms to suggest parasomnias such as nightmares, sleep walking or acting out of dreams.She is using CPAP most days of the week. Pt reports 8 hrs of average nightly use of CPAP. Pt denies snoring, gasping,choking.Pt also denies significant mask leak that is interfering with sleep. The 30 day compliance was downloaded which shows adequate compliance with more that 4 hr usage about 100%. The AHI is has improved to 6.4/hr. The mask leak is normal. The symptoms of  sleep apnea are under control with the current therapy.  Her last ECHO was on 5/25/20 which showed EF of 70% however last RVSP was from 9/9/19 which was 30 mmHg.    SLEEP HISTORY   A titration polysomnogram on June 24, 2019 demonstrated treatment emergent central apnea episodes on CPAP and BiPAP.  A follow-up titration polysomnogram on August 6, 2019 suggested a good response to ASV with expiratory pressures of 7 to 15 cm of water     PSG titration from  5/27/20 indicated severe obstructive sleep apnea with AHI of 78.7/hr and O2 kwadwo 76 %. Due to severity of the disease she met the split study protocol. The titration started with CPAP 6 cm and the best tolerated was CPAP 6 cm. The AHI improved to 0.82/hr with improved O2 kwadwo of 84% and average O2 saturation of 94%.      OPO on CPAP at 6 cmH2O from 6/23/20 indicated the lowest oxygen to be 84 % and she spent 6% of the recording time below 90%.  Technically the need for oxygen is determined based on time spent below 89%, therefore the % time below 90% saturation can not determine the true need for oxygen bleed in. Based on the OPO, oxygen bleed is not needed.           REVIEW OF SYSTEMS:       Constitutional: Denies fevers, Denies weight changes  Eyes: Denies changes in vision, no eye pain  Ears/Nose/Throat/Mouth: Denies nasal congestion or sore throat   Cardiovascular: Denies chest pain or palpitations   Respiratory: Denies shortness of breath , Denies cough  Gastrointestinal/Hepatic: Denies abdominal pain, nausea, vomiting, diarrhea, constipation or GI bleeding   Genitourinary: Deniesdysuria or frequency  Musculoskeletal/Rheum: Denies  joint pain and swelling   Skin/Breast: Denies rash,   Neurological: Denies headache, confusion, memory loss or focal weakness/parasthesias  Psychiatric: denies mood disorder   Sleep: denies snoring and EDS    Comprehensive review of systems form is reviewed with the patient and is attached in the EMR.     PMH:  has a past medical history of Abnormal thyroid function test (9/10/2014), Allergic rhinitis, Anxiety, Asthma, Back pain, Basal cell carcinoma, Bronchitis, Cardiac arrhythmia, Depression, Elevated liver enzymes (9/10/2014), Family history of melanoma, Fatigue (12/31/2015), GERD (gastroesophageal reflux disease), Glaucoma suspect of both eyes, Hyperlipidemia, Hypertension, Increased PTH level (5/4/2016), Nasal drainage, OCD (obsessive compulsive disorder), Pancreatitis,  "Parathyroid disorder (HCC), PCOS (polycystic ovarian syndrome), S/P ERCP, and Transient global amnesia.  MEDS:   Current Outpatient Medications:   •  methocarbamol (ROBAXIN) 500 MG Tab, Take 1-2 Tablets by mouth 2 times a day as needed., Disp: 60 tablet, Rfl: 0  •  losartan (COZAAR) 50 MG Tab, Take 1 tablet by mouth 2 times a day., Disp: 180 tablet, Rfl: 1  •  Multiple Vitamins-Minerals (PRESERVISION AREDS 2 PO), Take  by mouth., Disp: , Rfl:   •  omeprazole (PRILOSEC) 10 MG CAPSULE DELAYED RELEASE, TAKE 1 CAPSULE BY MOUTH TWICE A DAY, Disp: 180 capsule, Rfl: 0  •  sertraline (ZOLOFT) 25 MG tablet, TAKE 1-2 TABS BY MOUTH EVERY DAY. GRADUALLY INCREASE TO 2 TABS AS DAY AS TOLERATED. (Patient taking differently: Take 25 mg by mouth every day. Gradually increase to 2 tabs as day as tolerated.), Disp: 180 Tab, Rfl: 1  •  GLUCOSAMINE-CHONDROITIN PO, Take  by mouth., Disp: , Rfl:   •  VENTOLIN  (90 Base) MCG/ACT Aero Soln inhalation aerosol, INHALE 2 PUFFS BY MOUTH EVERY 6 HOURS AS NEEDED, Disp: 18 Each, Rfl: 3  •  AZASITE 1 % ophthalmic solution, , Disp: , Rfl:   •  Polyvinyl Alcohol-Povidone (REFRESH OP), Place 1 Drop in both eyes 4 times a day as needed., Disp: , Rfl:   •  Cholecalciferol (VITAMIN D) 2000 UNIT Tab, Take 2,000 Units by mouth every day., Disp: , Rfl:   ALLERGIES:   Allergies   Allergen Reactions   • Ace Inhibitors      Cough   • Ceclor [Cefaclor] Hives   • Ceftin Hives   • Cephalosporins Rash   • Ciprofloxacin Rash   • Clarithromycin      Arrhythmias. Able to take other -mycins.    • Kenalog Hives   • Lamisil [Terbinafine Hcl]    • Latex Rash   • Merthiolate [Thimerosal] Hives   • Monistat [Tioconazole] Itching   • Pcn [Penicillins] Hives   • Simvastatin      Myalgia   • Trazodone Hcl      \"wired\", \"felt really hot\"   • Benzalkonium Chloride Rash   • Tetracyclines Vomiting     SURGHX:   Past Surgical History:   Procedure Laterality Date   • ABDOMINAL HYSTERECTOMY TOTAL      ovaries remain   • " "ADENOIDECTOMY     • CARDIAC CATH, RIGHT HEART      normal, EKG was Abnormal    • CHOLECYSTECTOMY     • DILATION AND CURETTAGE     • EYE SURGERY      b/l iridotomy   • HYSTERECTOMY LAPAROSCOPY     • LUMPECTOMY      benign   • TONSILLECTOMY     • US-NEEDLE CORE BX-BREAST PANEL       SOCHX:  reports that she has never smoked. She has never used smokeless tobacco. She reports that she does not drink alcohol and does not use drugs..  FH:   Family History   Problem Relation Age of Onset   • Hypertension Mother    • Heart Disease Father         CHF   • Cancer Father         melanoma   • Hypertension Sister    • Stroke Sister    • Sleep Apnea Sister    • Cancer Paternal Grandfather         colon   • Heart Disease Paternal Grandfather    • Stroke Paternal Grandfather    • Diabetes Paternal Grandfather    • Other Maternal Grandmother         eplepsey   • Heart Attack Maternal Grandfather    • Stroke Maternal Grandfather    • Stroke Paternal Grandmother    • Sleep Apnea Sister          Physical Exam:  Vitals/ General Appearance:   Weight/BMI: Body mass index is 28.62 kg/m².  /72 (BP Location: Right arm, Patient Position: Sitting, BP Cuff Size: Adult)   Pulse 66   Resp 16   Ht 1.651 m (5' 5\")   Wt 78 kg (172 lb)   SpO2 95%   Vitals:    05/11/21 1447   BP: 112/72   BP Location: Right arm   Patient Position: Sitting   BP Cuff Size: Adult   Pulse: 66   Resp: 16   SpO2: 95%   Weight: 78 kg (172 lb)   Height: 1.651 m (5' 5\")       Pt. is alert and oriented to time, place and person. Cooperative and in no apparent distress.       Constitutional: Alert, no distress, well-groomed.  Skin: No rashes in visible areas.  Eye: Round. Conjunctiva clear, lids normal. No icterus.   ENMT: Lips pink without lesions, good dentition, moist mucous membranes. Phonation normal.  Neck: No masses, no thyromegaly. Moves freely without pain.  CV: Pulse as reported by patient  Respiratory: Unlabored respiratory effort, no cough or audible " wheeze  Psych: Alert and oriented x3, normal affect and mood.     ASSESSMENT AND PLAN     1. Sleep Apnea :The pathophysiology of sleep anea and the increased risk of cardiovascular morbidity from untreated sleep apnea is discussed in detail with the patient.    She is urged to avoid supine sleep, weight gain and alcoholic beverages since all of these can worsen sleep apnea. She is cautioned against drowsy driving. If She feels sleepy while driving, She must pull over for a break/nap, rather than persist on the road, in the interest of She own safety and that of others on the road.   Plan   - Continue CPAP 6 cm with vitera FFM   -The supplies are refilled   - compliance download was reviewed and discussed with the pt   - compliance was reinforced     2. Hx of pulmonary HTN: currently on nocturnal oxygen. ECHO is ordered today per pt's request.     3.  Acute insomnia due to grief.  She will be flying to [facility].  Short prescription of Ambien 5 mg with 15 tabs is given for the travel and her recent loss causing sleep disturbance.    Regarding treatment of other past medical problems and general health maintenance,  She is urged to follow up with PCP.     supervision

## 2022-09-10 NOTE — CHART NOTE - NSCHARTNOTEFT_GEN_A_CORE
Patient noted to have new onset afib . Cardiology was consulted and they recommend heparin. Discussed with neuroattending on call Dr sarah painter. Ok for heparin GTT stroke protocol. PTT Goal 45-55.

## 2022-09-10 NOTE — PROGRESS NOTE ADULT - SUBJECTIVE AND OBJECTIVE BOX
Neurology Progress Note    Interval History:  The patient was seen and examined at the bedside. CT showed R carotid aneusysm 2mm vs. infundibulum. Was started on Heparin for Afib  Subj: Feels well. Denied headache, blurry vision. Still with tingling of L lower mouth.      PAST MEDICAL & SURGICAL HISTORY:  No pertinent past medical history        Medications:  acetaminophen     Tablet .. 650 milliGRAM(s) Oral every 6 hours PRN  aluminum hydroxide/magnesium hydroxide/simethicone Suspension 30 milliLiter(s) Oral every 4 hours PRN  atorvastatin 80 milliGRAM(s) Oral at bedtime  heparin  Infusion. 1000 Unit(s)/Hr IV Continuous <Continuous>  melatonin 3 milliGRAM(s) Oral at bedtime PRN  methimazole 10 milliGRAM(s) Oral daily  ondansetron Injectable 4 milliGRAM(s) IV Push every 8 hours PRN  pantoprazole    Tablet 40 milliGRAM(s) Oral before breakfast      Vital Signs Last 24 Hrs  T(C): 36.3 (10 Sep 2022 11:30), Max: 36.9 (09 Sep 2022 18:59)  T(F): 97.3 (10 Sep 2022 11:30), Max: 98.5 (09 Sep 2022 18:59)  HR: 92 (10 Sep 2022 11:30) (74 - 105)  BP: 155/95 (10 Sep 2022 11:30) (120/74 - 183/101)  BP(mean): 129 (10 Sep 2022 11:30) (95 - 150)  RR: 18 (10 Sep 2022 11:30) (16 - 18)  SpO2: 99% (10 Sep 2022 11:30) (99% - 99%)    Parameters below as of 10 Sep 2022 11:30  Patient On (Oxygen Delivery Method): room air      NEURO EXAM:  General: Pleasant female sitting at edge of bed with food tray  Mental status: AAOx3. Intact speech and naming(Hungarian speaking, son at bedside).  Attention: normal concentrating ability.  Cranial Nerves: Pupils 3mm. EOMI, VFF, Difficulty closing L eye fully, L facial droop with decreased sensation to LT.  Tongue protrudes midline, normal shoulder shrug  Motor: 5/5 UE 5/5 LE. No pronator drift    Sensory exam: Intact and symmetric to light touch  Coordination:. No dysmetria on FTN. No dysdiadokinesia  Station:   Gait: Narrow    NIHSS: 2 (sensory and facial droop)    Labs:  CBC Full  -  ( 10 Sep 2022 11:10 )  WBC Count : 5.21 K/uL  RBC Count : 5.11 M/uL  Hemoglobin : 14.7 g/dL  Hematocrit : 43.8 %  Platelet Count - Automated : 248 K/uL  Mean Cell Volume : 85.7 fL  Mean Cell Hemoglobin : 28.8 pg  Mean Cell Hemoglobin Concentration : 33.6 g/dL  Auto Neutrophil # : x  Auto Lymphocyte # : x  Auto Monocyte # : x  Auto Eosinophil # : x  Auto Basophil # : x  Auto Neutrophil % : x  Auto Lymphocyte % : x  Auto Monocyte % : x  Auto Eosinophil % : x  Auto Basophil % : x    09-10    140  |  106  |  9<L>  ----------------------------<  124<H>  4.2   |  21  |  0.5<L>    Ca    9.2      10 Sep 2022 06:11  Phos  3.8     09-10  Mg     2.0     09-10    TPro  6.4  /  Alb  4.2  /  TBili  0.9  /  DBili  x   /  AST  25  /  ALT  27  /  AlkPhos  125<H>  09-10    LIVER FUNCTIONS - ( 10 Sep 2022 06:11 )  Alb: 4.2 g/dL / Pro: 6.4 g/dL / ALK PHOS: 125 U/L / ALT: 27 U/L / AST: 25 U/L / GGT: x           PT/INR - ( 09 Sep 2022 19:44 )   PT: 11.20 sec;   INR: 0.97 ratio         PTT - ( 10 Sep 2022 11:13 )  PTT:49.3 sec      Assessment:  This is a 67y Female w/ h/o     Plan:

## 2022-09-10 NOTE — H&P ADULT - NSHPPHYSICALEXAM_GEN_ALL_CORE
ICU Vital Signs Last 24 Hrs  T(C): 36.4 (09 Sep 2022 23:00), Max: 36.9 (09 Sep 2022 18:59)  T(F): 97.6 (09 Sep 2022 23:00), Max: 98.5 (09 Sep 2022 18:59)  HR: 82 (09 Sep 2022 23:55) (78 - 91)  BP: 178/104 (09 Sep 2022 23:55) (178/99 - 183/101)  BP(mean): 123 (09 Sep 2022 23:55) (123 - 123)  RR: 18 (09 Sep 2022 23:55) (18 - 18)  SpO2: 99% (09 Sep 2022 23:55) (99% - 99%)    O2 Parameters below as of 09 Sep 2022 23:55  Patient On (Oxygen Delivery Method): room air    GENERAL: NAD, lying in bed comfortably  CHEST/LUNG: Clear to auscultation bilaterally; No rales, rhonchi, wheezing, or rubs. Unlabored respirations  HEART: Regular rate and rhythm; No murmurs, rubs, or gallops  ABDOMEN: Bowel sounds present; Soft, Nontender, Nondistended.   EXTREMITIES:  2+ Peripheral Pulses, brisk capillary refill. No clubbing, cyanosis, or edema  NERVOUS SYSTEM:  A/O X3  no dysarthria. EOMI. PERRLA Left lower facial weakness.   Facial sensation in tact. Motor strenght intact. no drift

## 2022-09-10 NOTE — H&P ADULT - ATTENDING COMMENTS
66 yo RHF with pmhx of Hyperthyroidism , mrankin score of 0 at baseline, presents with left lower facial droop and numbness since 11am today that occurred at work.     r/o stroke   - monitor on tele  - neuro check q 4Telemonitoring  - permissive htn Keep sbp 140-180  s/p Asa 325mg + Plavix 75mg po x1   - start on Lipitor 80 mg q 24  - started on hep gtt for stroke protocol  - order MRI brain n/c   - Check lipid profile, hbaic, EKG  - check ECHO with bubble study  - PT/OT    #New onset afib   - check thyroid panel and if abnormal may need readjustment of hyperthyroid medication  - check echo   - start heparin drip for anticoagulation   - May need SHELLEY/CV     #hyperthyroid  - continue methimazole      # GI PPx - Protonix  # DVT PPx - hep gtt   # Activity -  Increase as Tolerated  # Dispo -   Patient to be discharged when medically optimized.  # Code Status - FULL

## 2022-09-10 NOTE — PROGRESS NOTE ADULT - ASSESSMENT
66 yo RHF with pmhx of Hyperthyroidism presented left lower facial droop and numbness. Brain Vessel imaging is negative for LVO or perfusion deficit and was out of tpa window. Incidentally found to have 2 mm vascular outpouching in the right carotid terminus could be a vascular infundibulum vs aneurysm. Fount to have Afib on EKG, started on heparin full ac.     Neuro  #r/o stroke: may be embolic since evidence of Afib.  - monitor on tele  - neuro check q 4Telemonitoring  - SBp goal 140-180  - s/p Asa 325mg + Plavix 75mg po x1   - start on Lipitor 80 mg q 24  - started on hep gtt for stroke protocol  - Pending MRI brain n/c   - A1C 6.2  - F/u lipid profile  - check ECHO with bubble study  - PT/OT    #New onset afib   - TSH was elevated  - Heparin gtt  - check echo   - May need SHELLEY/CV     #hyperthyroid  - continue methimazole  - May need to adjust      # GI PPx - Protonix  # DVT PPx - hep gtt   # Activity -  Increase as Tolerated  # Dispo -   Patient to be discharged when medically optimized.  # Code Status - FULL     68 yo RHF with pmhx of Hyperthyroidism presented left lower facial droop and numbness. Brain Vessel imaging is negative for LVO or perfusion deficit and was out of tpa window. Incidentally found to have 2 mm vascular outpouching in the right carotid terminus could be a vascular infundibulum vs aneurysm. Fount to have Afib on EKG, started on heparin full ac.     Neuro  #r/o stroke: may be embolic since evidence of Afib.  - monitor on tele  - neuro check q 4Telemonitoring  - SBp goal 140-180  - s/p Asa 325mg + Plavix 75mg po x1   - start on Lipitor 80 mg q 24  - started on hep gtt for stroke protocol  - Pending MRI brain n/c   - A1C 6.2  - F/u lipid profile  - check ECHO with bubble study  - PT/OT    #New onset afib   - TSH was elevated  - Heparin gtt (45-55 goal PTT)  - check echo   - May need SHELLEY/CV     #hyperthyroid  - continue methimazole  - May need to adjust      # GI PPx - Protonix  # DVT PPx - hep gtt   # Activity -  Increase as Tolerated  # Dispo -   Patient to be discharged when medically optimized.  # Code Status - FULL     66 yo RHF with pmhx of Hyperthyroidism presented left lower facial droop and numbness. Brain Vessel imaging is negative for LVO or perfusion deficit and was out of tpa window. Incidentally found to have 2 mm vascular outpouching in the right carotid terminus could be a vascular infundibulum vs aneurysm. Fount to have Afib on EKG, started on heparin full ac.     Neuro  #r/o stroke: may be embolic since evidence of Afib.  - monitor on tele  - neuro check q 4Telemonitoring  - SBp goal 140-180  - s/p Asa 325mg + Plavix 75mg po x1   - start on Lipitor 80 mg q 24  - started on hep gtt for stroke protocol  - Pending MRI brain n/c   - A1C 6.2  - F/u lipid profile  - check ECHO with bubble study  - PT/OT  nonurgent neuroendovascular consult for r/o aneurysm    #New onset afib   - TSH was elevated  - Heparin gtt (45-55 goal PTT)  - check echo   - May need SHELLEY/CV     #hyperthyroid  - continue methimazole  - May need to adjust      # GI PPx - Protonix  # DVT PPx - hep gtt   # Activity -  Increase as Tolerated  # Dispo -   Patient to be discharged when medically optimized.  # Code Status - FULL     68 yo RHF with pmhx of Hyperthyroidism presented left lower facial droop and numbness. Brain Vessel imaging is negative for LVO or perfusion deficit and was out of tpa window. Incidentally found to have 2 mm vascular outpouching in the right carotid terminus could be a vascular infundibulum vs aneurysm. Fount to have Afib on EKG, started on heparin full ac.     Neuro  #r/o stroke: may be embolic since evidence of Afib.  - monitor on tele  - neuro check q 4Telemonitoring  - SBp goal 140-180  - s/p Asa 325mg + Plavix 75mg po x1   - start on Lipitor 80 mg q 24  - started on hep gtt for stroke protocol  - Pending MRI brain n/c   - A1C 6.2  - F/u lipid profile  - check ECHO with bubble study  - PT/OT  nonurgent neuroendovascular consult for r/o aneurysm    #New onset afib   - TSH was elevated  - F/u T4  - Heparin gtt (45-55 goal PTT)  - Lopressor 2.5 q4h prn for RVR >120  - check echo   - May need SHELLEY/CV     #hyperthyroid  - continue methimazole  - F/u T4      # GI PPx - None  # DVT PPx - hep gtt   # Activity -  Increase as Tolerated  # Dispo -   Patient to be discharged when medically optimized.  # Code Status - FULL

## 2022-09-10 NOTE — CONSULT NOTE ADULT - ATTENDING COMMENTS
Patient seen and examined. Pertinent labs, imaging and telemetry reviewed. I agree with the above:     Patient still with left facial droop. Discussed case with patient and son.   Still remains in AF on monitor, though rate controlled.   No history of AF in past.   Patient is pending further stroke work up with MRI.   New onset AF likely cause of stroke and symptoms.   -Continue with heparin infusion and if no signs of bleeding, can switch to eliquis 5mg PO BID.   -Start metoprolol tartrate 12.5mg PO BID for rate control.   -Check TTE.   -Continue with ASA and atorvastatin.  -Check lipids, HA1c and TSH.   -Hx of hyperthyroid, which may contribute to AF.   -Once neurologically stable, may benefit from SHELLEY/DCCV next week to attempt NSR.     Discussed with patient, family and fellow.

## 2022-09-11 LAB
ALBUMIN SERPL ELPH-MCNC: 4.4 G/DL — SIGNIFICANT CHANGE UP (ref 3.5–5.2)
ALP SERPL-CCNC: 127 U/L — HIGH (ref 30–115)
ALT FLD-CCNC: 28 U/L — SIGNIFICANT CHANGE UP (ref 0–41)
ANION GAP SERPL CALC-SCNC: 13 MMOL/L — SIGNIFICANT CHANGE UP (ref 7–14)
APTT BLD: 39.3 SEC — HIGH (ref 27–39.2)
APTT BLD: 42.8 SEC — HIGH (ref 27–39.2)
APTT BLD: 46.3 SEC — HIGH (ref 27–39.2)
APTT BLD: 50.3 SEC — HIGH (ref 27–39.2)
AST SERPL-CCNC: 25 U/L — SIGNIFICANT CHANGE UP (ref 0–41)
BASOPHILS # BLD AUTO: 0.02 K/UL — SIGNIFICANT CHANGE UP (ref 0–0.2)
BASOPHILS NFR BLD AUTO: 0.4 % — SIGNIFICANT CHANGE UP (ref 0–1)
BILIRUB SERPL-MCNC: 1 MG/DL — SIGNIFICANT CHANGE UP (ref 0.2–1.2)
BLD GP AB SCN SERPL QL: SIGNIFICANT CHANGE UP
BUN SERPL-MCNC: 11 MG/DL — SIGNIFICANT CHANGE UP (ref 10–20)
CALCIUM SERPL-MCNC: 9.3 MG/DL — SIGNIFICANT CHANGE UP (ref 8.5–10.1)
CHLORIDE SERPL-SCNC: 104 MMOL/L — SIGNIFICANT CHANGE UP (ref 98–110)
CHOLEST SERPL-MCNC: 203 MG/DL — HIGH
CO2 SERPL-SCNC: 24 MMOL/L — SIGNIFICANT CHANGE UP (ref 17–32)
CREAT SERPL-MCNC: 0.6 MG/DL — LOW (ref 0.7–1.5)
EGFR: 99 ML/MIN/1.73M2 — SIGNIFICANT CHANGE UP
EOSINOPHIL # BLD AUTO: 0.07 K/UL — SIGNIFICANT CHANGE UP (ref 0–0.7)
EOSINOPHIL NFR BLD AUTO: 1.5 % — SIGNIFICANT CHANGE UP (ref 0–8)
GLUCOSE SERPL-MCNC: 129 MG/DL — HIGH (ref 70–99)
HCT VFR BLD CALC: 41.7 % — SIGNIFICANT CHANGE UP (ref 37–47)
HDLC SERPL-MCNC: 48 MG/DL — LOW
HGB BLD-MCNC: 14.2 G/DL — SIGNIFICANT CHANGE UP (ref 12–16)
IMM GRANULOCYTES NFR BLD AUTO: 0.2 % — SIGNIFICANT CHANGE UP (ref 0.1–0.3)
LIPID PNL WITH DIRECT LDL SERPL: 118 MG/DL — HIGH
LYMPHOCYTES # BLD AUTO: 2.05 K/UL — SIGNIFICANT CHANGE UP (ref 1.2–3.4)
LYMPHOCYTES # BLD AUTO: 43.3 % — SIGNIFICANT CHANGE UP (ref 20.5–51.1)
MCHC RBC-ENTMCNC: 28.9 PG — SIGNIFICANT CHANGE UP (ref 27–31)
MCHC RBC-ENTMCNC: 34.1 G/DL — SIGNIFICANT CHANGE UP (ref 32–37)
MCV RBC AUTO: 84.8 FL — SIGNIFICANT CHANGE UP (ref 81–99)
MONOCYTES # BLD AUTO: 0.41 K/UL — SIGNIFICANT CHANGE UP (ref 0.1–0.6)
MONOCYTES NFR BLD AUTO: 8.7 % — SIGNIFICANT CHANGE UP (ref 1.7–9.3)
NEUTROPHILS # BLD AUTO: 2.17 K/UL — SIGNIFICANT CHANGE UP (ref 1.4–6.5)
NEUTROPHILS NFR BLD AUTO: 45.9 % — SIGNIFICANT CHANGE UP (ref 42.2–75.2)
NON HDL CHOLESTEROL: 155 MG/DL — HIGH
NRBC # BLD: 0 /100 WBCS — SIGNIFICANT CHANGE UP (ref 0–0)
PLATELET # BLD AUTO: 245 K/UL — SIGNIFICANT CHANGE UP (ref 130–400)
POTASSIUM SERPL-MCNC: 4.2 MMOL/L — SIGNIFICANT CHANGE UP (ref 3.5–5)
POTASSIUM SERPL-SCNC: 4.2 MMOL/L — SIGNIFICANT CHANGE UP (ref 3.5–5)
PROT SERPL-MCNC: 6.5 G/DL — SIGNIFICANT CHANGE UP (ref 6–8)
RBC # BLD: 4.92 M/UL — SIGNIFICANT CHANGE UP (ref 4.2–5.4)
RBC # FLD: 13.2 % — SIGNIFICANT CHANGE UP (ref 11.5–14.5)
SODIUM SERPL-SCNC: 141 MMOL/L — SIGNIFICANT CHANGE UP (ref 135–146)
TRIGL SERPL-MCNC: 182 MG/DL — HIGH
WBC # BLD: 4.73 K/UL — LOW (ref 4.8–10.8)
WBC # FLD AUTO: 4.73 K/UL — LOW (ref 4.8–10.8)

## 2022-09-11 PROCEDURE — 99233 SBSQ HOSP IP/OBS HIGH 50: CPT

## 2022-09-11 PROCEDURE — 93306 TTE W/DOPPLER COMPLETE: CPT | Mod: 26

## 2022-09-11 PROCEDURE — 99232 SBSQ HOSP IP/OBS MODERATE 35: CPT

## 2022-09-11 RX ORDER — SODIUM CHLORIDE 9 MG/ML
500 INJECTION INTRAMUSCULAR; INTRAVENOUS; SUBCUTANEOUS ONCE
Refills: 0 | Status: COMPLETED | OUTPATIENT
Start: 2022-09-11 | End: 2022-09-11

## 2022-09-11 RX ORDER — METOPROLOL TARTRATE 50 MG
25 TABLET ORAL
Refills: 0 | Status: DISCONTINUED | OUTPATIENT
Start: 2022-09-11 | End: 2022-09-12

## 2022-09-11 RX ORDER — VALACYCLOVIR 500 MG/1
1000 TABLET, FILM COATED ORAL THREE TIMES A DAY
Refills: 0 | Status: DISCONTINUED | OUTPATIENT
Start: 2022-09-11 | End: 2022-09-12

## 2022-09-11 RX ADMIN — ATORVASTATIN CALCIUM 80 MILLIGRAM(S): 80 TABLET, FILM COATED ORAL at 21:21

## 2022-09-11 RX ADMIN — HEPARIN SODIUM 1200 UNIT(S)/HR: 5000 INJECTION INTRAVENOUS; SUBCUTANEOUS at 14:20

## 2022-09-11 RX ADMIN — HEPARIN SODIUM 1000 UNIT(S)/HR: 5000 INJECTION INTRAVENOUS; SUBCUTANEOUS at 07:19

## 2022-09-11 RX ADMIN — VALACYCLOVIR 1000 MILLIGRAM(S): 500 TABLET, FILM COATED ORAL at 21:24

## 2022-09-11 RX ADMIN — Medication 1 DROP(S): at 21:24

## 2022-09-11 RX ADMIN — HEPARIN SODIUM 1000 UNIT(S)/HR: 5000 INJECTION INTRAVENOUS; SUBCUTANEOUS at 00:08

## 2022-09-11 RX ADMIN — HEPARIN SODIUM 1200 UNIT(S)/HR: 5000 INJECTION INTRAVENOUS; SUBCUTANEOUS at 19:05

## 2022-09-11 RX ADMIN — Medication 1 DROP(S): at 23:57

## 2022-09-11 RX ADMIN — SODIUM CHLORIDE 1000 MILLILITER(S): 9 INJECTION INTRAMUSCULAR; INTRAVENOUS; SUBCUTANEOUS at 23:59

## 2022-09-11 RX ADMIN — Medication 25 MILLIGRAM(S): at 17:02

## 2022-09-11 RX ADMIN — PANTOPRAZOLE SODIUM 40 MILLIGRAM(S): 20 TABLET, DELAYED RELEASE ORAL at 05:53

## 2022-09-11 NOTE — PROGRESS NOTE ADULT - ASSESSMENT
66 yo RHF with pmhx of Hyperthyroidism presented left lower facial droop and numbness. Brain Vessel imaging is negative for LVO or perfusion deficit and was out of tpa window. Incidentally found to have 2 mm vascular outpouching in the right carotid terminus could be a vascular infundibulum vs aneurysm. Fount to have Afib on EKG, started on heparin full ac.     Neuro  #r/o stroke: may be embolic since evidence of Afib.  - monitor on tele  - neuro check q2 as effectively triple therapy Telemonitoring  - SBp goal 140-180  - s/p Asa 325mg + Plavix 75mg po x1   - start on Lipitor 80 mg q 24  - started on hep gtt for stroke protocol  - Pending MRI brain n/c   - A1C 6.2  - F/u lipid profile  - check ECHO with bubble study  - PT/OT  nonurgent neuroendovascular consult for r/o aneurysm    #New onset afib   - TSH was elevated  - F/u T4  - Heparin gtt (45-55 goal PTT)  - Lopressor 2.5 q4h prn for RVR >120  - check echo   - May need SHELLEY/CV     #hyperthyroid  - continue methimazole  - F/u T4      # GI PPx - None  # DVT PPx - hep gtt   # Activity -  Increase as Tolerated  # Dispo -   Patient to be discharged when medically optimized.  # Code Status - FULL      Constanza Barksdale MD  Attending Hospitalist

## 2022-09-11 NOTE — PROGRESS NOTE ADULT - SUBJECTIVE AND OBJECTIVE BOX
SUBJECTIVE:    Patient is a 67y old Female who presents with a chief complaint of Left facial droop (09 Sep 2022 19:57)    Currently admitted to medicine with the primary diagnosis of Facial droop due to acute stroke       Today is hospital day 2d.  Remains in stroke unit    PAST MEDICAL & SURGICAL HISTORY  No pertinent past medical history      SOCIAL HISTORY:  Negative for smoking/alcohol/drug use.     ALLERGIES:  No Known Allergies    MEDICATIONS:  STANDING MEDICATIONS  atorvastatin 80 milliGRAM(s) Oral at bedtime  heparin  Infusion. 1000 Unit(s)/Hr IV Continuous <Continuous>  methimazole 10 milliGRAM(s) Oral daily  pantoprazole    Tablet 40 milliGRAM(s) Oral before breakfast    PRN MEDICATIONS  acetaminophen     Tablet .. 650 milliGRAM(s) Oral every 6 hours PRN  aluminum hydroxide/magnesium hydroxide/simethicone Suspension 30 milliLiter(s) Oral every 4 hours PRN  melatonin 3 milliGRAM(s) Oral at bedtime PRN  metoprolol tartrate Injectable 2.5 milliGRAM(s) IV Push every 4 hours PRN  ondansetron Injectable 4 milliGRAM(s) IV Push every 8 hours PRN    VITALS:   T(F): 98.4  HR: 90  BP: 138/78  RR: 18  SpO2: 98%    PHYSICAL EXAM:     Mental status: AAOx3. Intact speech and naming([place] speaking)  Attention: normal concentrating ability.  Cranial Nerves: Pupils 3mm. EOMI, VFF, Difficulty closing L eye fully, L facial droop with decreased sensation to LT.  Tongue protrudes midline, normal shoulder shrug  Motor: 5/5 UE 5/5 LE. No pronator drift    Sensory exam: Intact and symmetric to light touch  Coordination:. No dysmetria on FTN. No dysdiadokinesia  Station:   Gait: Narrow      LABS:                        14.2   4.73  )-----------( 245      ( 11 Sep 2022 08:07 )             41.7     09-11    141  |  104  |  11  ----------------------------<  129<H>  4.2   |  24  |  0.6<L>    Ca    9.3      11 Sep 2022 08:07  Phos  3.8     09-10  Mg     2.0     09-10    TPro  6.5  /  Alb  4.4  /  TBili  1.0  /  DBili  x   /  AST  25  /  ALT  28  /  AlkPhos  127<H>  09-11    PT/INR - ( 10 Sep 2022 23:16 )   PT: 11.20 sec;   INR: 0.97 ratio         PTT - ( 11 Sep 2022 08:07 )  PTT:46.3 sec            CARDIAC MARKERS ( 09 Sep 2022 19:44 )  x     / <0.01 ng/mL / x     / x     / x            RADIOLOGY:

## 2022-09-11 NOTE — OCCUPATIONAL THERAPY INITIAL EVALUATION ADULT - GENERAL OBSERVATIONS, REHAB EVAL
Pt received semi sim in bed in NAD, agreeable to OT evaluation, +tele, +BP cuff, +pulse oxi, left seated at edge of bed in NAD, all lines intact, RN aware

## 2022-09-11 NOTE — PROGRESS NOTE ADULT - ASSESSMENT
68 yo RHF with pmhx of Hyperthyroidism presented left lower facial droop and numbness. Brain Vessel imaging is negative for LVO or perfusion deficit and was out of tpa window. Incidentally found to have 2 mm vascular outpouching in the right carotid terminus could be a vascular infundibulum vs aneurysm. Fount to have Afib on EKG, started on heparin full ac.     Neuro  #r/o stroke: may be embolic since evidence of Afib.  - monitor on tele  - neuro check q2 as effectively triple therapy Telemonitoring  - SBp goal 140-180  - s/p Asa 325mg + Plavix 75mg po x1   - start on Lipitor 80 mg q 24  - started on hep gtt for stroke protocol  - Pending MRI brain n/c   - A1C 6.2  - F/u lipid profile  - check ECHO with bubble study  - PT/OT  nonurgent neuroendovascular consult for r/o aneurysm    #New onset afib   - TSH was elevated  - F/u T4  - Heparin gtt (45-55 goal PTT)  - Lopressor 2.5 q4h prn for RVR >120  - check echo   - May need SHELLEY/CV     #hyperthyroid  - continue methimazole  - F/u T4      # GI PPx - None  # DVT PPx - hep gtt   # Activity -  Increase as Tolerated  # Dispo -   Patient to be discharged when medically optimized.  # Code Status - FULL     68 yo RHF with pmhx of Hyperthyroidism presented left lower facial droop and numbness. Brain Vessel imaging is negative for LVO or perfusion deficit and was out of tpa window. Incidentally found to have 2 mm vascular outpouching in the right carotid terminus could be a vascular infundibulum vs aneurysm. Fount to have Afib on EKG, started on heparin full ac.     Neuro  #r/o stroke: may be embolic since evidence of Afib.  - monitor on tele  - neuro check q4   - SBp goal 120-180   - Stroke Core Measures- atorvastatin 80mg ( LDL- 132 ); Trig- 126; HGBA1C- 6.2  - started on hep gtt for stroke protocol  - Pending MRI brain n/c   - - No current indication for ASA - stroke believed to be embolic  - F/u lipid profile  - check ECHO with bubble study  - PT/OT  - Incidental inferiorly projecting 2 mm vascular outpouching in the right     carotid terminus could reflect a vascular infundibulum versus aneurysm  - nonurgent neuroendovascular consult for r/o aneurysm    #New onset afib   - TSH was elevated- will repeat in 1-2 weeks   - F/u free T4   - Heparin gtt (45-55 goal PTT)  - lopressoe 25mg q 12 ( Hold if SBP < 120)  - check echo   - May need SHELLEY/CV     #hyperthyroid  - continue methimazole  - F/U TFT's  - F/u T4      # GI PPx - None  # DVT PPx - hep gtt   # Activity -  Increase as Tolerated  # Dispo -   Patient to be discharged when medically optimized.  # Code Status - FULL     66 yo RHF with pmhx of Hyperthyroidism presented left lower facial droop and numbness. Brain Vessel imaging is negative for LVO or perfusion deficit and was out of tpa window. Incidentally found to have 2 mm vascular outpouching in the right carotid terminus could be a vascular infundibulum vs aneurysm. Fount to have Afib on EKG, started on heparin full ac.     Neuro  #r/o stroke: Likely [place] Palsy   - Start valcyclovir if MRI neg start prednisone  - monitor on tele  - neuro check q4   - SBp goal 120-180   - Stroke Core Measures- atorvastatin 80mg ( LDL- 132 ); Trig- 126; HGBA1C- 6.2  - started on hep gtt for stroke protocol  - Pending MRI brain n/c   - - No current indication for ASA - stroke believed to be embolic  - F/u lipid profile  - check ECHO with bubble study  - PT/OT  - Incidental inferiorly projecting 2 mm vascular outpouching in the right     carotid terminus could reflect a vascular infundibulum versus aneurysm  - nonurgent neuroendovascular consult for r/o aneurysm    #New onset afib   - TSH was elevated- will repeat in 1-2 weeks   - F/u free T4   - Heparin gtt (45-55 goal PTT)  - lopressoe 25mg q 12 ( Hold if SBP < 120)  - check echo   - May need SHELLEY/CV     #hyperthyroid  - continue methimazole  - F/U TFT's  - F/u T4      # GI PPx - None  # DVT PPx - hep gtt   # Activity -  Increase as Tolerated  # Dispo -   Patient to be discharged when medically optimized.  # Code Status - FULL

## 2022-09-11 NOTE — PROGRESS NOTE ADULT - SUBJECTIVE AND OBJECTIVE BOX
Neurology Progress Note    Interval History:  No acute events overnight.    HPI:  68 yo RHF with pmhx of Hyperthyroidism , mrankin score of 0 at baseline, presents with left lower facial droop and numbness since 11am today that occurred at work. She denies any ear pain, foreign-body sensation in her eye, loss of taste , fever chills, dizziness, speech deficits , dysphagia or focal extremity weakness and no previous h/o stroke.  Pt does endorse hx of palpitations that started today. EKG in the ED showed new onset afib  (10 Sep 2022 03:24)      PAST MEDICAL & SURGICAL HISTORY:  No pertinent past medical history    Medications:  acetaminophen     Tablet .. 650 milliGRAM(s) Oral every 6 hours PRN  aluminum hydroxide/magnesium hydroxide/simethicone Suspension 30 milliLiter(s) Oral every 4 hours PRN  atorvastatin 80 milliGRAM(s) Oral at bedtime  heparin  Infusion. 1000 Unit(s)/Hr IV Continuous <Continuous>  melatonin 3 milliGRAM(s) Oral at bedtime PRN  methimazole 10 milliGRAM(s) Oral daily  metoprolol tartrate Injectable 2.5 milliGRAM(s) IV Push every 4 hours PRN  ondansetron Injectable 4 milliGRAM(s) IV Push every 8 hours PRN  pantoprazole    Tablet 40 milliGRAM(s) Oral before breakfast      Vital Signs Last 24 Hrs  T(C): 36.9 (11 Sep 2022 01:59), Max: 37 (10 Sep 2022 18:22)  T(F): 98.4 (11 Sep 2022 01:59), Max: 98.6 (10 Sep 2022 18:22)  HR: 74 (11 Sep 2022 06:00) (74 - 105)  BP: 127/82 (11 Sep 2022 06:00) (119/78 - 182/106)  BP(mean): 104 (11 Sep 2022 06:00) (94 - 150)  RR: 18 (11 Sep 2022 06:00) (18 - 18)  SpO2: 99% (11 Sep 2022 06:00) (95% - 99%)    Parameters below as of 11 Sep 2022 06:00  Patient On (Oxygen Delivery Method): room air      NEURO EXAM:  General: Pleasant female sitting at edge of bed with food tray  Mental status: AAOx3. Intact speech and naming(Niuean speaking, son at bedside).  Attention: normal concentrating ability.  Cranial Nerves: Pupils 3mm. EOMI, VFF, Difficulty closing L eye fully, L facial droop with decreased sensation to LT.  Tongue protrudes midline, normal shoulder shrug  Motor: 5/5 UE 5/5 LE. No pronator drift    Sensory exam: Intact and symmetric to light touch  Coordination:. No dysmetria on FTN. No dysdiadokinesia  Station:   Gait: Narrow    NIHSS: 2 (sensory and facial droop)      Labs:  CBC Full  -  ( 10 Sep 2022 11:10 )  WBC Count : 5.21 K/uL  RBC Count : 5.11 M/uL  Hemoglobin : 14.7 g/dL  Hematocrit : 43.8 %  Platelet Count - Automated : 248 K/uL  Mean Cell Volume : 85.7 fL  Mean Cell Hemoglobin : 28.8 pg  Mean Cell Hemoglobin Concentration : 33.6 g/dL  Auto Neutrophil # : x  Auto Lymphocyte # : x  Auto Monocyte # : x  Auto Eosinophil # : x  Auto Basophil # : x  Auto Neutrophil % : x  Auto Lymphocyte % : x  Auto Monocyte % : x  Auto Eosinophil % : x  Auto Basophil % : x    09-10    140  |  106  |  9<L>  ----------------------------<  124<H>  4.2   |  21  |  0.5<L>    Ca    9.2      10 Sep 2022 06:11  Phos  3.8     09-10  Mg     2.0     09-10    TPro  6.4  /  Alb  4.2  /  TBili  0.9  /  DBili  x   /  AST  25  /  ALT  27  /  AlkPhos  125<H>  09-10    LIVER FUNCTIONS - ( 10 Sep 2022 06:11 )  Alb: 4.2 g/dL / Pro: 6.4 g/dL / ALK PHOS: 125 U/L / ALT: 27 U/L / AST: 25 U/L / GGT: x           PT/INR - ( 10 Sep 2022 23:16 )   PT: 11.20 sec;   INR: 0.97 ratio         PTT - ( 11 Sep 2022 05:43 )  PTT:50.3 sec     Neurology Progress Note    Interval History:  No acute events overnight.    HPI:  66 yo RHF with pmhx of Hyperthyroidism , mrankin score of 0 at baseline, presents with left lower facial droop and numbness since 11am today that occurred at work. She denies any ear pain, foreign-body sensation in her eye, loss of taste , fever chills, dizziness, speech deficits , dysphagia or focal extremity weakness and no previous h/o stroke.  Pt does endorse hx of palpitations that started today. EKG in the ED showed new onset afib  (10 Sep 2022 03:24)    Admission NIHSS  NIHSS: 2 (sensory and facial droop)   Hosp Course - New onset  Afib with  RVR     PAST MEDICAL & SURGICAL HISTORY:  No pertinent past medical history    ICU Vital Signs Last 24 Hrs  T(C): 36.8 (11 Sep 2022 15:00), Max: 37 (10 Sep 2022 18:22)  T(F): 98.2 (11 Sep 2022 15:00), Max: 98.6 (10 Sep 2022 18:22)  HR: 82 (11 Sep 2022 15:00) (74 - 98)  BP: 161/82 (11 Sep 2022 15:00) (119/78 - 182/104)  BP(mean): 111 (11 Sep 2022 15:00) (94 - 133)  RR: 18 (11 Sep 2022 15:00) (18 - 20)  SpO2: 99% (11 Sep 2022 15:00) (98% - 99%)    O2 Parameters below as of 11 Sep 2022 15:00  Patient On (Oxygen Delivery Method): room air    MEDICATIONS  (STANDING):  atorvastatin 80 milliGRAM(s) Oral at bedtime  heparin  Infusion. 1000 Unit(s)/Hr (10 mL/Hr) IV Continuous <Continuous>  methimazole 10 milliGRAM(s) Oral daily  pantoprazole    Tablet 40 milliGRAM(s) Oral before breakfast    MEDICATIONS  (PRN):  acetaminophen     Tablet .. 650 milliGRAM(s) Oral every 6 hours PRN Temp greater or equal to 38C (100.4F), Mild Pain (1 - 3)  aluminum hydroxide/magnesium hydroxide/simethicone Suspension 30 milliLiter(s) Oral every 4 hours PRN Dyspepsia  LORazepam     Tablet 2 milliGRAM(s) Oral once PRN Anxiety  melatonin 3 milliGRAM(s) Oral at bedtime PRN Insomnia  metoprolol tartrate Injectable 2.5 milliGRAM(s) IV Push every 4 hours PRN Sustained RVR > 120  ondansetron Injectable 4 milliGRAM(s) IV Push every 8 hours PRN Nausea and/or Vomiting                          14.2   4.73  )-----------( 245      ( 11 Sep 2022 08:07 )             41.7   09-11    141  |  104  |  11  ----------------------------<  129<H>  4.2   |  24  |  0.6<L>    Ca    9.3      11 Sep 2022 08:07  Phos  3.8     09-10  Mg     2.0     09-10    TPro  6.5  /  Alb  4.4  /  TBili  1.0  /  DBili  x   /  AST  25  /  ALT  28  /  AlkPhos  127<H>  09-11          PT/INR - ( 10 Sep 2022 23:16 )   PT: 11.20 sec;   INR: 0.97 ratio         PTT - ( 11 Sep 2022 05:43 )  PTT:50.3 sec      CT Angio Brain Stroke Protocol  w/ IV Cont (09.09.22 @ 19:44) >  IMPRESSION:    CT PERFUSION:  No perfusion deficits to suggest areas of completed infarction or at risk   territory.      CTA HEAD/NECK:  No large vessel occlusion or vascular malformation.    Incidental inferiorly projecting 2 mm vascular outpouching in the right   carotid terminus could reflect a vascular infundibulum versus aneurysm.    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          NEURO EXAM:  General: Pleasant female sitting at edge of bed with food tray  Mental status: AAOx3. Intact speech and naming(Irish speaking, son at bedside).  Attention: normal concentrating ability.  Cranial Nerves: Pupils 3mm. EOMI, VFF, Difficulty closing L eye fully, L facial droop with decreased sensation to LT.  Tongue protrudes midline, normal shoulder shrug  Motor: 5/5 UE 5/5 LE. No pronator drift    Sensory exam: Intact and symmetric to light touch  Coordination:. No dysmetria on FTN. No dysdiadokinesia  Station:   Gait: Narrow

## 2022-09-11 NOTE — PROGRESS NOTE ADULT - ATTENDING COMMENTS
Patient chart reviewed , meds , labs and imaging reviewed, Physical exam performed . Assessment and plan formulated by myself an dteam

## 2022-09-11 NOTE — OCCUPATIONAL THERAPY INITIAL EVALUATION ADULT - PERTINENT HX OF CURRENT PROBLEM, REHAB EVAL
68 yo RHF with pmhx of Hyperthyroidism , mrankin score of 0 at baseline, presents with left lower facial droop and numbness since 11am today that occurred at work. She denies any ear pain, foreign-body sensation in her eye, loss of taste , fever chills, dizziness, speech deficits , dysphagia or focal extremity weakness and no previous h/o stroke.  Pt does endorse hx of palpitations that started today. EKG in the ED showed new onset afib

## 2022-09-11 NOTE — OCCUPATIONAL THERAPY INITIAL EVALUATION ADULT - NS ASR FOLLOW COMMAND OT EVAL
Spoke with patient, he verbalized that he plans on coming to 1:1 appointment with me tomorrow morning    Grant Martinez, RDN, LDN, [facility]
100% of the time/able to follow multistep instructions/able to follow single-step instructions

## 2022-09-11 NOTE — OCCUPATIONAL THERAPY INITIAL EVALUATION ADULT - REHAB POTENTIAL, OT EVAL
Pt currently functioning at baseline with ADLs and functional transfers.  No skilled OT at this time, D/C OT

## 2022-09-12 ENCOUNTER — TRANSCRIPTION ENCOUNTER (OUTPATIENT)
Age: 67
End: 2022-09-12

## 2022-09-12 VITALS
OXYGEN SATURATION: 99 % | HEART RATE: 74 BPM | RESPIRATION RATE: 18 BRPM | SYSTOLIC BLOOD PRESSURE: 153 MMHG | DIASTOLIC BLOOD PRESSURE: 98 MMHG | TEMPERATURE: 98 F

## 2022-09-12 DIAGNOSIS — E05.90 THYROTOXICOSIS, UNSPECIFIED WITHOUT THYROTOXIC CRISIS OR STORM: ICD-10-CM

## 2022-09-12 LAB
A1C WITH ESTIMATED AVERAGE GLUCOSE RESULT: 6.3 % — HIGH (ref 4–5.6)
APTT BLD: 34 SEC — SIGNIFICANT CHANGE UP (ref 27–39.2)
APTT BLD: 52.7 SEC — HIGH (ref 27–39.2)
APTT BLD: 63 SEC — HIGH (ref 27–39.2)
ESTIMATED AVERAGE GLUCOSE: 134 MG/DL — HIGH (ref 68–114)
HCT VFR BLD CALC: 39.9 % — SIGNIFICANT CHANGE UP (ref 37–47)
HCV AB S/CO SERPL IA: 0.04 COI — SIGNIFICANT CHANGE UP
HCV AB SERPL-IMP: SIGNIFICANT CHANGE UP
HGB BLD-MCNC: 13.8 G/DL — SIGNIFICANT CHANGE UP (ref 12–16)
MCHC RBC-ENTMCNC: 29.4 PG — SIGNIFICANT CHANGE UP (ref 27–31)
MCHC RBC-ENTMCNC: 34.6 G/DL — SIGNIFICANT CHANGE UP (ref 32–37)
MCV RBC AUTO: 85.1 FL — SIGNIFICANT CHANGE UP (ref 81–99)
NRBC # BLD: 0 /100 WBCS — SIGNIFICANT CHANGE UP (ref 0–0)
PLATELET # BLD AUTO: 243 K/UL — SIGNIFICANT CHANGE UP (ref 130–400)
RBC # BLD: 4.69 M/UL — SIGNIFICANT CHANGE UP (ref 4.2–5.4)
RBC # FLD: 13.1 % — SIGNIFICANT CHANGE UP (ref 11.5–14.5)
T3 SERPL-MCNC: 122 NG/DL — SIGNIFICANT CHANGE UP (ref 80–200)
T4 AB SER-ACNC: 6.6 UG/DL — SIGNIFICANT CHANGE UP (ref 4.6–12)
TSH SERPL-MCNC: 4.17 UIU/ML — SIGNIFICANT CHANGE UP (ref 0.27–4.2)
WBC # BLD: 5.97 K/UL — SIGNIFICANT CHANGE UP (ref 4.8–10.8)
WBC # FLD AUTO: 5.97 K/UL — SIGNIFICANT CHANGE UP (ref 4.8–10.8)

## 2022-09-12 PROCEDURE — 99233 SBSQ HOSP IP/OBS HIGH 50: CPT

## 2022-09-12 PROCEDURE — 99239 HOSP IP/OBS DSCHRG MGMT >30: CPT

## 2022-09-12 PROCEDURE — 70551 MRI BRAIN STEM W/O DYE: CPT | Mod: 26

## 2022-09-12 RX ORDER — APIXABAN 2.5 MG/1
5 TABLET, FILM COATED ORAL
Refills: 0 | Status: DISCONTINUED | OUTPATIENT
Start: 2022-09-12 | End: 2022-09-12

## 2022-09-12 RX ORDER — ATORVASTATIN CALCIUM 80 MG/1
1 TABLET, FILM COATED ORAL
Qty: 30 | Refills: 0
Start: 2022-09-12 | End: 2022-10-11

## 2022-09-12 RX ORDER — METOPROLOL TARTRATE 50 MG
1 TABLET ORAL
Qty: 30 | Refills: 0
Start: 2022-09-12 | End: 2022-10-11

## 2022-09-12 RX ORDER — APIXABAN 2.5 MG/1
1 TABLET, FILM COATED ORAL
Qty: 60 | Refills: 1
Start: 2022-09-12 | End: 2022-11-10

## 2022-09-12 RX ADMIN — PANTOPRAZOLE SODIUM 40 MILLIGRAM(S): 20 TABLET, DELAYED RELEASE ORAL at 05:08

## 2022-09-12 RX ADMIN — VALACYCLOVIR 1000 MILLIGRAM(S): 500 TABLET, FILM COATED ORAL at 13:25

## 2022-09-12 RX ADMIN — VALACYCLOVIR 1000 MILLIGRAM(S): 500 TABLET, FILM COATED ORAL at 05:08

## 2022-09-12 RX ADMIN — Medication 25 MILLIGRAM(S): at 05:07

## 2022-09-12 RX ADMIN — Medication 1 DROP(S): at 09:12

## 2022-09-12 RX ADMIN — Medication 1 DROP(S): at 12:23

## 2022-09-12 RX ADMIN — HEPARIN SODIUM 1000 UNIT(S)/HR: 5000 INJECTION INTRAVENOUS; SUBCUTANEOUS at 00:57

## 2022-09-12 RX ADMIN — Medication 1 DROP(S): at 05:07

## 2022-09-12 RX ADMIN — HEPARIN SODIUM 1000 UNIT(S)/HR: 5000 INJECTION INTRAVENOUS; SUBCUTANEOUS at 06:58

## 2022-09-12 NOTE — PROGRESS NOTE ADULT - SUBJECTIVE AND OBJECTIVE BOX
Neurology Progress Note    Interval History:    Patient was seen and examined, no acute event over night.     Medications:  acetaminophen     Tablet .. 650 milliGRAM(s) Oral every 6 hours PRN  aluminum hydroxide/magnesium hydroxide/simethicone Suspension 30 milliLiter(s) Oral every 4 hours PRN  artificial  tears Solution 1 Drop(s) Left EYE every 3 hours  atorvastatin 80 milliGRAM(s) Oral at bedtime  heparin  Infusion. 1000 Unit(s)/Hr IV Continuous <Continuous>  LORazepam     Tablet 2 milliGRAM(s) Oral once PRN  melatonin 3 milliGRAM(s) Oral at bedtime PRN  methimazole 10 milliGRAM(s) Oral daily  metoprolol tartrate 25 milliGRAM(s) Oral two times a day  metoprolol tartrate Injectable 2.5 milliGRAM(s) IV Push every 4 hours PRN  ondansetron Injectable 4 milliGRAM(s) IV Push every 8 hours PRN  pantoprazole    Tablet 40 milliGRAM(s) Oral before breakfast  valACYclovir 1000 milliGRAM(s) Oral three times a day      Vital Signs Last 24 Hrs  T(C): 36.9 (12 Sep 2022 04:07), Max: 36.9 (12 Sep 2022 04:07)  T(F): 98.4 (12 Sep 2022 04:07), Max: 98.4 (12 Sep 2022 04:07)  HR: 79 (12 Sep 2022 04:07) (72 - 100)  BP: 138/78 (12 Sep 2022 04:07) (110/59 - 161/82)  BP(mean): 114 (12 Sep 2022 04:07) (78 - 127)  RR: 18 (12 Sep 2022 04:07) (18 - 20)  SpO2: 99% (12 Sep 2022 04:07) (98% - 99%)    Parameters below as of 12 Sep 2022 04:07  Patient On (Oxygen Delivery Method): room air        Neurological Examination:  General:  Appearance is consistent with chronologic age.   Cognitive/Language:  Awake, alert, and oriented to person, place, time and date.  Recent and remote memory intact.  Fund of knowledge is appropriate.  Naming, repetition and comprehension intact. Nondysarthric.    Cranial Nerves  - Eyes: Visual acuity intact, Visual fields full.  EOMI w/o nystagmus, skew or reported double vision.  PERRL.  No ptosis/weakness of eyelid closure.    - Face:  Facial sensation normal V1 - 3, no facial asymmetry.    - Ears/Nose/Throat:  Hearing grossly intact b/l to finger rub.  Palate elevates midline.  Tongue and uvula midline.   Motor examination:  (MRC grade R/L) 5/5 UE; 5/5 LE.  No observable drift. Normal tone and bulk. No tenderness, twitching, tremors or involuntary movements.  Sensory examination:  Intact to light touch and pinprick, pain, temperature and proprioception and vibration in all extremities.  Reflexes:   2+ b/l biceps, triceps, patella and achilles.  Plantar response downgoing b/l.  Jaw jerk, Yamila, clonus absent.  Cerebellum:   FTN/HKS intact.  No dysmetria.    Gait narrow based and normal.    Labs:  CBC Full  -  ( 11 Sep 2022 08:07 )  WBC Count : 4.73 K/uL  RBC Count : 4.92 M/uL  Hemoglobin : 14.2 g/dL  Hematocrit : 41.7 %  Platelet Count - Automated : 245 K/uL  Mean Cell Volume : 84.8 fL  Mean Cell Hemoglobin : 28.9 pg  Mean Cell Hemoglobin Concentration : 34.1 g/dL  Auto Neutrophil # : 2.17 K/uL  Auto Lymphocyte # : 2.05 K/uL  Auto Monocyte # : 0.41 K/uL  Auto Eosinophil # : 0.07 K/uL  Auto Basophil # : 0.02 K/uL  Auto Neutrophil % : 45.9 %  Auto Lymphocyte % : 43.3 %  Auto Monocyte % : 8.7 %  Auto Eosinophil % : 1.5 %  Auto Basophil % : 0.4 %    09-11    141  |  104  |  11  ----------------------------<  129<H>  4.2   |  24  |  0.6<L>    Ca    9.3      11 Sep 2022 08:07    TPro  6.5  /  Alb  4.4  /  TBili  1.0  /  DBili  x   /  AST  25  /  ALT  28  /  AlkPhos  127<H>  09-11    LIVER FUNCTIONS - ( 11 Sep 2022 08:07 )  Alb: 4.4 g/dL / Pro: 6.5 g/dL / ALK PHOS: 127 U/L / ALT: 28 U/L / AST: 25 U/L / GGT: x           PT/INR - ( 10 Sep 2022 23:16 )   PT: 11.20 sec;   INR: 0.97 ratio         PTT - ( 12 Sep 2022 06:20 )  PTT:52.7 sec      RADIOLOGY & ADDITIONAL TESTS:   Neurology Progress Note    Interval History:    No acute events overnight.   Patient was seen and examined this morning. Sitting by the edge of the bed, pleasant and interactive during the exam. Patient endorses Left sided facial paralysis that has not changed since admission. Patient denies any changes in vision, changes in hearing, loss of appetite or taste, headache, dizziness, numbness or tingling in face and extremities, chest pain, palpitation, constipation/diarrhea. Patient denies any other complains at this time.  Medications:  acetaminophen     Tablet .. 650 milliGRAM(s) Oral every 6 hours PRN  aluminum hydroxide/magnesium hydroxide/simethicone Suspension 30 milliLiter(s) Oral every 4 hours PRN  artificial  tears Solution 1 Drop(s) Left EYE every 3 hours  atorvastatin 80 milliGRAM(s) Oral at bedtime  heparin  Infusion. 1000 Unit(s)/Hr IV Continuous <Continuous>  LORazepam     Tablet 2 milliGRAM(s) Oral once PRN  melatonin 3 milliGRAM(s) Oral at bedtime PRN  methimazole 10 milliGRAM(s) Oral daily  metoprolol tartrate 25 milliGRAM(s) Oral two times a day  metoprolol tartrate Injectable 2.5 milliGRAM(s) IV Push every 4 hours PRN  ondansetron Injectable 4 milliGRAM(s) IV Push every 8 hours PRN  pantoprazole    Tablet 40 milliGRAM(s) Oral before breakfast  valACYclovir 1000 milliGRAM(s) Oral three times a day      Vital Signs Last 24 Hrs  T(C): 36.9 (12 Sep 2022 04:07), Max: 36.9 (12 Sep 2022 04:07)  T(F): 98.4 (12 Sep 2022 04:07), Max: 98.4 (12 Sep 2022 04:07)  HR: 79 (12 Sep 2022 04:07) (72 - 100)  BP: 138/78 (12 Sep 2022 04:07) (110/59 - 161/82)  BP(mean): 114 (12 Sep 2022 04:07) (78 - 127)  RR: 18 (12 Sep 2022 04:07) (18 - 20)  SpO2: 99% (12 Sep 2022 04:07) (98% - 99%)    Parameters below as of 12 Sep 2022 04:07  Patient On (Oxygen Delivery Method): room air        Neurological Examination:  General:  Appearance is consistent with chronologic age.   Cognitive/Language:  Awake, alert, and oriented to person, place, time and date.  Recent and remote memory intact.  Fund of knowledge is appropriate.  Naming, repetition and comprehension intact. Nondysarthric.    Cranial Nerves  - Eyes: L eye cover in place, Visual acuity intact, Visual fields full.  EOMI w/o nystagmus, skew or reported double vision.  PERRL.  No ptosis, Moderate-severe weakness of eyelid closure present on the Left side. No weakness of eyelid closure on the Right.    - Face:  Facial sensation normal V1 - 3, Moderate -severe Left sided facial hemiparesis.  - Ears/Nose/Throat:  Hearing grossly intact b/l to finger rub.  Palate elevates midline.  Tongue and uvula midline.   Motor examination:  (MRC grade R/L) 5/5 UE; 5/5 LE.  No observable drift. Normal tone and bulk. No tenderness, twitching, tremors or involuntary movements.  Sensory examination:  Intact to light touch and pinprick, pain, temperature and proprioception and vibration in all extremities.  Reflexes:   2+ b/l biceps, triceps, patella and achilles.  Plantar response downgoing b/l.  Jaw jerk, Yamila, clonus absent.  Cerebellum:   FTN/HKS intact.  No dysmetria.    Gait narrow based and normal.    Labs:  CBC Full  -  ( 11 Sep 2022 08:07 )  WBC Count : 4.73 K/uL  RBC Count : 4.92 M/uL  Hemoglobin : 14.2 g/dL  Hematocrit : 41.7 %  Platelet Count - Automated : 245 K/uL  Mean Cell Volume : 84.8 fL  Mean Cell Hemoglobin : 28.9 pg  Mean Cell Hemoglobin Concentration : 34.1 g/dL  Auto Neutrophil # : 2.17 K/uL  Auto Lymphocyte # : 2.05 K/uL  Auto Monocyte # : 0.41 K/uL  Auto Eosinophil # : 0.07 K/uL  Auto Basophil # : 0.02 K/uL  Auto Neutrophil % : 45.9 %  Auto Lymphocyte % : 43.3 %  Auto Monocyte % : 8.7 %  Auto Eosinophil % : 1.5 %  Auto Basophil % : 0.4 %    09-11    141  |  104  |  11  ----------------------------<  129<H>  4.2   |  24  |  0.6<L>    Ca    9.3      11 Sep 2022 08:07    TPro  6.5  /  Alb  4.4  /  TBili  1.0  /  DBili  x   /  AST  25  /  ALT  28  /  AlkPhos  127<H>  09-11    LIVER FUNCTIONS - ( 11 Sep 2022 08:07 )  Alb: 4.4 g/dL / Pro: 6.5 g/dL / ALK PHOS: 127 U/L / ALT: 28 U/L / AST: 25 U/L / GGT: x           PT/INR - ( 10 Sep 2022 23:16 )   PT: 11.20 sec;   INR: 0.97 ratio         PTT - ( 12 Sep 2022 06:20 )  PTT:52.7 sec      RADIOLOGY & ADDITIONAL TESTS:

## 2022-09-12 NOTE — DISCHARGE NOTE NURSING/CASE MANAGEMENT/SOCIAL WORK - NSDCPEFALRISK_GEN_ALL_CORE
For information on Fall & Injury Prevention, visit: [email or web address] OR  [email or web address] OR  https://www.cdc.gov/steadi/patient.html

## 2022-09-12 NOTE — PROGRESS NOTE ADULT - ASSESSMENT
66 yo RHF with pmhx of Hyperthyroidism presented left lower facial droop and numbness. Brain Vessel imaging is negative for LVO or perfusion deficit and was out of tpa window. Incidentally found to have 2 mm vascular outpouching in the right carotid terminus could be a vascular infundibulum vs aneurysm. Fount to have Afib on EKG, started on heparin full ac.     Neuro  #r/o stroke: Likely [place] Palsy   - Start valcyclovir if MRI neg start prednisone  - monitor on tele  - neuro check q4   - SBp goal 120-180   - Stroke Core Measures- atorvastatin 80mg ( LDL- 132 ); Trig- 126; HGBA1C- 6.2  - started on hep gtt for stroke protocol  - Pending MRI brain n/c   - - No current indication for ASA - stroke believed to be embolic  - F/u lipid profile  - check ECHO with bubble study  - PT/OT  - Incidental inferiorly projecting 2 mm vascular outpouching in the right     carotid terminus could reflect a vascular infundibulum versus aneurysm  - nonurgent neuroendovascular consult for r/o aneurysm    #New onset afib   - TSH was elevated- will repeat in 1-2 weeks   - F/u free T4   - Heparin gtt (45-55 goal PTT)  - lopressoe 25mg q 12 ( Hold if SBP < 120)  - check echo   - May need SHELLEY/CV     #hyperthyroid  - continue methimazole  - F/U TFT's  - F/u T4      # GI PPx - None  # DVT PPx - hep gtt   # Activity -  Increase as Tolerated  # Dispo -   Patient to be discharged when medically optimized.  # Code Status - FULL   This is a 68 yo RHF with pmhx significant for Hyperthyroidism on Methimazole presented to the ED for left lower facial droop and numbness on 9/9. CT head was negative for any acute intracranial pathology and CTA/CTP showed no evidence of LVO or perfusion deficit. Patient was not a candidate for tPA as was out of window. In the ED patient was found to have new onset Afib on EKG and was admitted to stroke unit for further management and work up.    Neuro  #r/o stroke vs [place] Palsy   - Left sided facial hemiparesis persisted since admission  - CTA/CTP negative for any Acute ICH, no evidence of LVO or perfusion deficit  - CTA/CTP Incidental finding of  inferiorly projecting 2 mm vascular outpouching in the right carotid terminus could reflect a vascular infundibulum versus aneurysm  - Echo   - Valcyclovir 1000mg three times a day was started on 9/11, if MRI neg will start prednisone for [place] palsy management  - c/w Valcyclovir 1000mg TID  - Monitor on tele  - F/u MRI brain wo  - Neuro check q4   - SBp goal 120-180   - c/w atorvastatin 80mg  - c/w hep gtt for stroke protocol and Afib  - No current indication for ASA - stroke believed to be embolic    - PT/OT  - Incidental inferiorly projecting 2 mm vascular outpouching in the right     carotid terminus could reflect a vascular infundibulum versus aneurysm  - nonurgent neuroendovascular consult for r/o aneurysm    #New onset afib   - TSH was elevated- will repeat in 1-2 weeks   - F/u free T4   - Heparin gtt (45-55 goal PTT)  - lopressoe 25mg q 12 ( Hold if SBP < 120)  - check echo   - May need SHELLEY/CV     #hyperthyroid  - continue methimazole  - F/U TFT's  - F/u T4      # GI PPx - None  # DVT PPx - hep gtt   # Activity -  Increase as Tolerated  # Dispo -   Patient to be discharged when medically optimized.  # Code Status - FULL   This is a 66 yo RHF with pmhx significant for Hyperthyroidism on Methimazole presented to the ED for left lower facial droop and numbness on 9/9. CT head was negative for any acute intracranial pathology and CTA/CTP showed no evidence of LVO or perfusion deficit. Patient was not a candidate for tPA as was out of window. In the ED patient was found to have new onset Afib on EKG and was admitted to stroke unit for further management and work up.    Neuro  #Ischemic stroke vs [place] Palsy   - Left sided facial hemiparesis persisted since admission  - CTA/CTP negative for any Acute ICH, no evidence of LVO or perfusion deficit  - CTA/CTP Incidental finding of inferiorly projecting 2 mm vascular outpouching in the right carotid terminus could reflect a vascular infundibulum versus aneurysm  - Echo (9/11) - EF 67%, No PFO, Normal left ventricular systolic function  - Valcyclovir 1000mg three times a day was started on 9/11, if MRI neg will start prednisone for [place] palsy management  - c/w Valcyclovir 1000mg TID  - Monitor on tele  - F/u MRI brain wo  - Neuro check q4   - SBP goal 120-180   - c/w Atorvastatin 80mg  - c/w hep gtt for stroke protocol   - No current indication for ASA - stroke believed to be embolic  - PT/OT - No OT recommended, Outpatient PT for facial exercises (9/10)       #New onset afib   - TSH was elevated (5.64) - will repeat in 1-2 weeks   - F/u free T4   - On Heparin gtt (45-55 goal PTT)  - c/w Lopressor 25mg q 12 ( Hold if SBP <120)  - Echo (9/11) - EF 67%, No PFO, Normal left ventricular systolic function  - May need SHELLEY/CV     #Hyperthyroid  -Home med: Methimazole 5mg BID  - TSH 5.64  - c/w methimazole 10mg Daily   - F/u T4, TFTs      #GI PPx - Protonix 40mg daily  #DVT PPx - hep gtt   #Activity -  Increase as Tolerated  #Dispo -   Patient to be discharged when medically optimized.  # Code Status - FULL   This is a 66 yo RHF with pmhx significant for Hyperthyroidism on Methimazole presented to the ED for left lower facial droop and numbness on 9/9. CT head was negative for any acute intracranial pathology and CTA/CTP showed no evidence of LVO or perfusion deficit. Patient was not a candidate for tPA as was out of window. In the ED patient was found to have new onset Afib on EKG and was admitted to stroke unit for further management and work up.    Neuro  #[place] Palsy   - Left sided facial hemiparesis persisted since admission  - CTA/CTP negative for any Acute ICH, no evidence of LVO or perfusion deficit  - CTA/CTP Incidental finding of inferiorly projecting 2 mm vascular outpouching in the right carotid terminus could reflect a vascular infundibulum versus aneurysm  - Echo (9/11) - EF 67%, No PFO, Normal left ventricular systolic function  - Valcyclovir 1000mg three times a day was started on 9/11, if MRI neg will start prednisone for [place] palsy management  - c/w Valcyclovir 1000mg TID  - Monitor on tele  - MRI brain wo showed no evidence of acute ischemic event  - Neuro check q4   - SBP goal 120-180   - c/w Atorvastatin 80mg  - c/w hep gtt for stroke protocol   - No current indication for ASA - stroke believed to be embolic  - PT/OT - No OT recommended, Outpatient PT for facial exercises (9/10)   - c/w eye drops   - c/w using eye cover at night and when outside      #New onset afib   - TSH was elevated (5.64) - will repeat in 1-2 weeks   - F/u free T4   - On Heparin gtt (45-55 goal PTT)  - c/w Lopressor 25mg q 12 ( Hold if SBP <120)  - Echo (9/11) - EF 67%, No PFO, Normal left ventricular systolic function  - May need SHELLEY/CV     #Hyperthyroid  -Home med: Methimazole 5mg BID  - TSH 5.64  - c/w methimazole 10mg Daily   - F/u T4, TFTs      #GI PPx - Protonix 40mg daily  #DVT PPx - hep gtt   #Activity -  Increase as Tolerated  #Dispo -   Patient to be discharged when medically optimized.  # Code Status - FULL

## 2022-09-12 NOTE — DISCHARGE NOTE NURSING/CASE MANAGEMENT/SOCIAL WORK - NSDCFUADDAPPT_GEN_ALL_CORE_FT
1. Please contact the Neurology and Cardiology clinic to make appointment to be seen.   2. The eye clinic will contact you to make an eye appointment.   3. Please follow up with your primary care doctor regarding your thyroid function

## 2022-09-12 NOTE — DISCHARGE NOTE PROVIDER - HOSPITAL COURSE
HPI:  66 yo RHF with pmhx of Hyperthyroidism , mrankin score of 0 at baseline, presents with left lower facial droop and numbness since 11am today that occurred at work. She denies any ear pain, foreign-body sensation in her eye, loss of taste , fever chills, dizziness, speech deficits , dysphagia or focal extremity weakness and no previous h/o stroke.  Pt does endorse hx of palpitations that started today. EKG in the ED showed new onset afib  (10 Sep 2022 03:24)   HPI:  This is a 66 yo RHF with pmhx of Hyperthyroidism on Methimazole 5mg BID, mrankin score of 0 at baseline, presented to ED on 9/9 with left lower facial droop and numbness since 11am that day while at work that persisted throughout the day. She denied any ear pain, foreign-body sensation in her eye, loss of taste , fever chills, dizziness, speech deficits , dysphagia or focal extremity weakness and no previous h/o stroke.  Pt does endorsed hx of palpitations that started on 9/9. EKG in the ED showed new onset afib. CTH was negative for any acute intracranial hemorrhage. Patient was not a candidate for tPA as was out of window and was admitted to stroke unit for further stroke work up and management.    Neuro  #[place] Palsy   - Left sided facial hemiparesis persisted since admission  - CTA/CTP negative for any Acute ICH, no evidence of LVO or perfusion deficit  - CTA/CTP Incidental finding of inferiorly projecting 2 mm vascular outpouching in the right carotid terminus could reflect a vascular infundibulum versus aneurysm  - MRI Brain wo contrast showed No acute intracranial pathology  - Echo (9/11) - EF 67%, No PFO, Normal left ventricular systolic function  - Valcyclovir 1000mg three times a day was started on 9/11  - c/w Valcyclovir 1000mg TID  - Start on medrol Pack   - Neuro check q4   - SBP goal 120-180   - c/w Atorvastatin 80mg  - Stopped hep gtt   - Start Eliquis 5mg BID   - No current indication for ASA - stroke believed to be embolic  - PT/OT - No OT recommended, Outpatient PT for facial exercises (9/10)   - c/w Artificial tears solution in Left eye Q3H   - c/w applying lacri-Lube in both eyes at night  - c/w using eye cover at night and when outside      #New onset afib   - TSH was elevated (5.64) - will repeat in 1-2 weeks   - F/u free T4   - Stopped Heparin gtt (45-55 goal PTT)  - Start Eliquis 5mg BID to be discharged on   - c/w Lopressor 25mg q 12 ( Hold if SBP <120)  - Echo (9/11) - EF 67%, No PFO, Normal left ventricular systolic function  - Outpatient follow up with cardiology    #Hyperthyroid  -Home med: Methimazole 5mg BID  - TSH 5.64  - c/w methimazole 10mg Daily   - F/u T4, TFTs  - Outpatient follow up with PCP/endocrinologist      #GI PPx - Protonix 40mg daily  #Activity -  Increase as Tolerated  #Dispo - Patient stable for discharge  # Code Status - FULL       HPI:  This is a 68 yo RHF with pmhx of Hyperthyroidism on Methimazole 5mg BID, mrankin score of 0 at baseline, presented to ED on 9/9 with left lower facial droop and numbness since 11am that day while at work that persisted throughout the day. She denied any ear pain, foreign-body sensation in her eye, loss of taste , fever chills, dizziness, speech deficits , dysphagia or focal extremity weakness and no previous h/o stroke.  Pt does endorsed hx of palpitations that started on 9/9. EKG in the ED showed new onset afib. CTH was negative for any acute intracranial hemorrhage. Patient was not a candidate for tPA as was out of window and was admitted to stroke unit for further stroke work up and management.    Neuro  #[place] Palsy   - Left sided facial hemiparesis persisted since admission  - CTA/CTP negative for any Acute ICH, no evidence of LVO or perfusion deficit  - CTA/CTP Incidental finding of inferiorly projecting 2 mm vascular outpouching in the right carotid terminus could reflect a vascular infundibulum versus aneurysm  - MRI Brain wo contrast showed No acute intracranial pathology  - Echo (9/11) - EF 67%, No PFO, Normal left ventricular systolic function  - Valcyclovir 1000mg three times a day was started on 9/11  - c/w Valcyclovir 1000mg TID  - Start on medrol Pack   - Neuro check q4   - SBP goal 120-180   - c/w Atorvastatin 80mg  - Stopped hep gtt   - Start Eliquis 5mg BID   - No current indication for ASA - stroke believed to be embolic  - PT/OT - No OT recommended, Outpatient PT for facial exercises (9/10)   - c/w Artificial tears solution in Left eye Q3H   - c/w applying lacri-Lube in both eyes at night  - c/w using eye cover at night and when outside      #New onset afib   - TSH was elevated (5.64) - will repeat in 1-2 weeks   - F/u free T4   - Stopped Heparin gtt (45-55 goal PTT)  - Start Eliquis 5mg BID to be discharged on   - c/w Lopressor 25mg q 12 ( Hold if SBP <120), Switch to lopressor 50mg daily on Discharge  - Echo (9/11) - EF 67%, No PFO, Normal left ventricular systolic function  - Outpatient follow up with cardiology    #Hyperthyroid  -Home med: Methimazole 5mg BID  - TSH 5.64  - c/w methimazole 10mg Daily   - F/u T4, TFTs  - Outpatient follow up with PCP/endocrinologist      #GI PPx - Protonix 40mg daily  #Activity -  Increase as Tolerated  #Dispo - Patient stable for discharge  # Code Status - FULL       HPI:  This is a 66 yo RHF with pmhx of Hyperthyroidism on Methimazole 5mg BID, mrankin score of 0 at baseline, presented to ED on 9/9 with left lower facial droop and numbness since 11am that day while at work that persisted throughout the day. She denied any ear pain, foreign-body sensation in her eye, loss of taste , fever chills, dizziness, speech deficits , dysphagia or focal extremity weakness and no previous h/o stroke.  Pt does endorsed hx of palpitations that started on 9/9. EKG in the ED showed new onset afib. CTH was negative for any acute intracranial hemorrhage. Patient was not a candidate for tPA as was out of window and was admitted to stroke unit for further stroke work up and management.    Ms. Bustamante was w/u and observed in the Stoke Unit for 3 days. All stroke w/u was negative for cerebral pathology. 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 She was started on Antiviral therapy during her admission and will be discharged on steroid taper, eye drops and lubricant. Recommendations for opthalmology evaluation was made post discharge. Her hospital course was complicated by New Onset Paroxymal Afib/flutter. She was started on AC for a [id number] of 2 and BB. She was recommended to follow up with her primary care doctor for her tyroid function and cardiology for her New onset Afib.              HPI:  This is a 68 yo RHF with pmhx of Hyperthyroidism on Methimazole 5mg BID, mrankin score of 0 at baseline, presented to ED on 9/9 with left lower facial droop and numbness since 11am that day while at work that persisted throughout the day. She denied any ear pain, foreign-body sensation in her eye, loss of taste , fever chills, dizziness, speech deficits , dysphagia or focal extremity weakness and no previous h/o stroke.  Pt does endorsed hx of palpitations that started on 9/9. EKG in the ED showed new onset afib. CTH was negative for any acute intracranial hemorrhage. Patient was not a candidate for tPA as was out of window and was admitted to stroke unit for further stroke work up and management.    Ms. Bustamante was w/u and observed in the Stoke Unit for 3 days. All stroke w/u was negative for cerebral pathology. 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 She was started on Antiviral therapy during her admission and will be discharged on steroid taper, eye drops and lubricant. Recommendations for opthalmology evaluation was made post discharge. Her hospital course was complicated by New Onset Paroxymal Afib/flutter. She was started on AC for a [id number] of 2 and BB. She was recommended to follow up with her primary care doctor for her tyroid function and cardiology for her New onset Afib.     Attending Attestation:  Patient seen and examined and agree with above except as noted.  Patients history, notes, labs, imaging, vitals and meds reviewed personally.  Patient MRI brain negative for stroke and exam suggestive of bells palsy.  Also found ot have new onset afib and will be started on A/C

## 2022-09-12 NOTE — DISCHARGE NOTE PROVIDER - NSDCMRMEDTOKEN_GEN_ALL_CORE_FT
methIMAzole 5 mg oral tablet: 1 tab(s) orally 2 times a day   apixaban 5 mg oral tablet: 1 tab(s) orally 2 times a day  Artificial Tears ophthalmic solution: 1 drop(s) in the left eye every 4 hours   atorvastatin 40 mg oral tablet: 1 tab(s) orally once a day (at bedtime)   methIMAzole 5 mg oral tablet: 1 tab(s) orally 2 times a day  ocular lubricant ophthalmic ointment: 1 application to each affected eye once a day (at bedtime)  predniSONE 20 mg oral tablet: Take 3 Tabs orally for 3 days then   Take 2 Tabs orally for 3 days then   Take 1 Tab orally for 3 days then STOP  Toprol-XL 50 mg oral tablet, extended release: 1 tab(s) orally once a day

## 2022-09-12 NOTE — DISCHARGE NOTE PROVIDER - NSDCFUSCHEDAPPT_GEN_ALL_CORE_FT
[facility]  OPHTHALM  Ricardo Canas  Scheduled Appointment: 09/21/2022

## 2022-09-12 NOTE — DISCHARGE NOTE PROVIDER - CARE PROVIDER_API CALL
DEEPTHI BAIN  Family Practice  [address]  [place] [address]  Phone: [phone]  Fax: [phone]  Established Patient  Follow Up Time: 2 weeks

## 2022-09-12 NOTE — DISCHARGE NOTE PROVIDER - NSFOLLOWUPCLINICS_GEN_ALL_ED_FT
[facility]  Neurology  [address], [address]  [place] [address]  Phone: [phone]  Fax:   Follow Up Time: 1 month    [facility]  Cardiology  [address], [address]  [place] [address]  Phone: [phone]  Fax: [phone]  Follow Up Time: 2 weeks

## 2022-09-12 NOTE — DISCHARGE NOTE PROVIDER - NSDCCPCAREPLAN_GEN_ALL_CORE_FT
PRINCIPAL DISCHARGE DIAGNOSIS  Diagnosis: Left-sided Bell's palsy  Assessment and Plan of Treatment: Patient presented to ED on 9/9 for left sided facial droop and numbness that sshe noticed earlier in the morning around 11am when she was at work that persisted throughout the day. Pateint denied any associated changes in vision, hearing, loss of taste, dizziness, speech deficits, dysphagia or focal extremity weakness. Stroke code was called and NIHSS was calculated as 1 for mild left sided facial asymmetry. Patient was out of window for tPA and not a candidate. CT head was negative fr any acute intracranial pathologies. CTA/CTP showed no evidence of perfusion defecit or LVO with incidental finding of inferiorly projecting 2mm vascular outpouching in R carotid terminus. EKG done in the ED found new onset Atrial fibrillation and patient was admitted to rule out ischemic stroke vs [place] palsy and furhter managemnet. During admission, Echocardiogram was done on 9/11/2022 with normal EF of 67% and no evidence of PFO or systolic dysfunction. MRI brain without contrast performed on 9/12/2022 was negative for any acute intracranial pathology and diagnosis of [place] palsy was confirmed. Patient was started on Valcyclovir 1000mg three times a day on 9/11 and will be started on a 5 course of steroid taper therapy today on discharge.  Patient was informed of her diagnosis of bells palsy. It was explained to the patient that [place] Palsy is a condition where the nerve that controls the muscles of the face becomes injured or inflammed and causes the facial muscles to become weak or paralyzed. It was explained the that most people recover completely over course of few weeks up to 3 months but a minority of people continue to have some symptoms for life. Patient was educated on covering her eyes at night and while outside as she is at risk for developing eye ulcers and continue to use the prescribed eye drop Q3H and lubricant at night. Patient was informed of risk for developing eye ulcerations and that she needs to follow up with ophtholmology outpatient in 2-3 weeks.      SECONDARY DISCHARGE DIAGNOSES  Diagnosis: Atrial fibrillation  Assessment and Plan of Treatment: On initial presetnation at ED on 9/9/2022, EKG was obtained and new onset atrial fibrillation was noted. Patient was admitted for stroke worke up and while in the unit was seen and examined by the Cardiology team. Patient was started on Heparin drip on 9/10 per cardiology recommendations and stroke protocol. On 9/11 patient was started on metoprolol 25mg BID for heart rate control.  Echocardiogram was performed on 9/11/2022 showing normal EF of 67% and no sign of systolic dysfunction. MRI brain on 9/12/2022 ruled out any acute ischemic events and heparin drip was stopped and patient was switched to Eliquis 5mg BID and is stable for discharge.   Patient was informed of her diagnosing and educated that she is at increased risk for future ischemic stroke. Patient was informed that she needs to continue taking Eliquis 5mg BID and metorolol 50mg daily upon discharge with outpatient follow up with Cardiology.     PRINCIPAL DISCHARGE DIAGNOSIS  Diagnosis: Left-sided Bell's palsy  Assessment and Plan of Treatment: Patient presented to ED on 9/9/2022 for left sided facial droop and numbness that she noticed earlier in the morning around 11am when she was at work that persisted throughout the day. Patient denied any associated changes in vision, hearing, loss of taste, dizziness, speech deficits, dysphagia or focal extremity weakness. Stroke code was called and NIHSS was calculated as 1 for mild left sided facial asymmetry. Patient was out of window for tPA and not a candidate. CT head was negative for any acute intracranial pathologies. CTA/CTP showed no evidence of perfusion defecit or LVO with incidental finding of inferiorly projecting 2mm vascular outpouching in R carotid terminus. EKG done in the ED showed new onset Atrial fibrillation. Patient was admitted to rule out ischemic stroke vs [place] palsy and for furhter managemnet. During admission, Echocardiogram was done on 9/11/2022 with normal EF of 67% and no evidence of PFO or systolic dysfunction. MRI brain without contrast performed on 9/12/2022 was negative for any acute intracranial pathology and diagnosis of [place] palsy was confirmed. Patient was started on Valcyclovir 1000mg three times a day on 9/11/2022 and will be started on a 5 course of steroid taper therapy today 9/12 on discharge.  Patient was informed of her diagnosis of bells palsy. It was explained to the patient that [place] Palsy is a condition where the nerve that controls the muscles of the face becomes injured or inflammed and causes the facial muscles to become weak or paralyzed. It was explained that most people recover completely over course of few weeks up to 3 months but a minority of people continue to have some symptoms for life. Patient was educated on covering her eyes at night and while outside as she is at risk for developing eye ulcers and continue to use the prescribed eye drop Q3H and lubricant at night. Patient was informed that she needs to follow up with ophtholmology outpatient in 2-3 weeks and outpatient PT for facial exercises.      SECONDARY DISCHARGE DIAGNOSES  Diagnosis: Atrial fibrillation  Assessment and Plan of Treatment: On initial presetnation at ED on 9/9/2022, EKG was obtained and new onset atrial fibrillation was noted. Patient was admitted for stroke work up and while in the unit was seen and examined by the Cardiology team. Patient was started on Heparin drip on 9/10/2022 per cardiology recommendations and stroke protocol. On 9/11 patient was started on metoprolol 25mg BID for heart rate control.  Echocardiogram was performed on 9/11/2022 showing normal EF of 67% and no sign of systolic dysfunction. MRI brain on 9/12/2022 ruled out any acute ischemic events and heparin drip was stopped consequently and patient was switched to Eliquis 5mg BID. Patient is stable for discharge today 9/12/2022.   Patient was informed of her diagnosis and educated that she is at increased risk for future ischemic stroke. Patient was informed that she needs to continue taking Eliquis 5mg BID and metorolol 50mg daily upon discharge with outpatient follow up with Cardiology.

## 2022-09-12 NOTE — DISCHARGE NOTE PROVIDER - NSDCFUADDINST_GEN_ALL_CORE_FT
1. Please record your blood pressure at home and keep a blood pressure diary.   2. Please continue to use the eye lubricant and eye drops as prescribed

## 2022-09-12 NOTE — DISCHARGE NOTE PROVIDER - NSDCCPTREATMENT_GEN_ALL_CORE_FT
PRINCIPAL PROCEDURE  Procedure: MRI brain wo contrast  Findings and Treatment:   < end of copied text >  IMPRESSION:  No acute intracranial pathology.  Mild chronic microvascular ischemic changes.  < from: MR Head No Cont (09.12.22 @ 09:17) >        SECONDARY PROCEDURE  Procedure: CTA head w/w/o contrast  Findings and Treatment:   < end of copied text >  IMPRESSION:  CT PERFUSION:  No perfusion deficits to suggest areas of completed infarction or at risk   territory.  CTA HEAD/NECK:  No large vessel occlusion or vascular malformation.  Incidental inferiorly projecting 2 mm vascular outpouching in the right   carotid terminus could reflect a vascular infundibulum versus aneurysm.  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  --- End of Report ---  < from: CT Angio Brain Stroke Protocol  w/ IV Cont (09.09.22 @ 19:44) >

## 2022-09-12 NOTE — CONSULT NOTE ADULT - SUBJECTIVE AND OBJECTIVE BOX
DIANE NICOLAS  67y  Female    Patient is a 67y old  Female who presents with a chief complaint of R/o Ischemic Stroke in setting of New Onset Afib (12 Sep 2022 07:30)      HPI:  68 yo RHF with pmhx of Hyperthyroidism , mrankin score of 0 at baseline, presents with left lower facial droop and numbness since 11am today that occurred at work. She denies any ear pain, foreign-body sensation in her eye, loss of taste , fever chills, dizziness, speech deficits , dysphagia or focal extremity weakness and no previous h/o stroke.  Pt does endorse hx of palpitations that started today. EKG in the ED showed new onset afib  (10 Sep 2022 03:24)    S: Patient was examined and seen at bedside. This morning pt is resting comfortably in bed and reports no new issues or overnight events. No complaints, feels better  Denies CP, SOB, N/V/D/C/AP, cough, F, chills, dizziness, new focal weakness, HA, vision changes, dysuria, or urinary symptoms, blood in stool.  ROS: all other systems reviewed and are negative    PAST MEDICAL & SURGICAL HISTORY:  No pertinent past medical history        SOCIAL HISTORY:  Tobacco: denies  Illicit drugs: denies  Alcohol: social  Family history reviewed and otherwise non-contributory No clotting disorders, CVAs at early age.  ALLERGIES: NKDA    MEDICATIONS  (STANDING):  artificial  tears Solution 1 Drop(s) Left EYE every 3 hours  atorvastatin 80 milliGRAM(s) Oral at bedtime  heparin  Infusion. 1000 Unit(s)/Hr (10 mL/Hr) IV Continuous <Continuous>  methimazole 10 milliGRAM(s) Oral daily  metoprolol tartrate 25 milliGRAM(s) Oral two times a day  pantoprazole    Tablet 40 milliGRAM(s) Oral before breakfast  valACYclovir 1000 milliGRAM(s) Oral three times a day    MEDICATIONS  (PRN):  acetaminophen     Tablet .. 650 milliGRAM(s) Oral every 6 hours PRN Temp greater or equal to 38C (100.4F), Mild Pain (1 - 3)  aluminum hydroxide/magnesium hydroxide/simethicone Suspension 30 milliLiter(s) Oral every 4 hours PRN Dyspepsia  LORazepam     Tablet 2 milliGRAM(s) Oral once PRN Anxiety  melatonin 3 milliGRAM(s) Oral at bedtime PRN Insomnia  metoprolol tartrate Injectable 2.5 milliGRAM(s) IV Push every 4 hours PRN Sustained RVR > 120  ondansetron Injectable 4 milliGRAM(s) IV Push every 8 hours PRN Nausea and/or Vomiting    Home Medications:  methIMAzole 5 mg oral tablet: 1 tab(s) orally 2 times a day (10 Sep 2022 03:27)          Vital Signs Last 24 Hrs  T(C): 36.9 (12 Sep 2022 04:07), Max: 36.9 (12 Sep 2022 04:07)  T(F): 98.4 (12 Sep 2022 04:07), Max: 98.4 (12 Sep 2022 04:07)  HR: 79 (12 Sep 2022 04:07) (72 - 100)  BP: 138/78 (12 Sep 2022 04:07) (110/59 - 161/82)  BP(mean): 114 (12 Sep 2022 04:07) (78 - 127)  RR: 18 (12 Sep 2022 04:07) (18 - 20)  SpO2: 99% (12 Sep 2022 04:07) (98% - 99%)    Parameters below as of 12 Sep 2022 04:07  Patient On (Oxygen Delivery Method): room air      CAPILLARY BLOOD GLUCOSE          General: NAD. Looks stated age.  HEENT: clean oropharynx, EOMI, no LAD  Neck: trachea midline, no thyromegaly  CV: nl S1 S2; no m/r/g  Resp: decreased breath sounds at base  GI: NT/ND/S +BS  MS: no clubbing/cyanosis/edema, +pulses  Neuro: motor, sensory intact; + reflexes  Skin: no rashes, nl turgor  Psychiatric: AA0x3 w/ intact insight and judgement    tele: Aflutter nonspecific changes (on my own evaluation of tele monitor)        LABS:                        13.8   5.97  )-----------( 243      ( 12 Sep 2022 06:20 )             39.9     09-11    141  |  104  |  11  ----------------------------<  129<H>  4.2   |  24  |  0.6<L>    Ca    9.3      11 Sep 2022 08:07    TPro  6.5  /  Alb  4.4  /  TBili  1.0  /  DBili  x   /  AST  25  /  ALT  28  /  AlkPhos  127<H>  09-11    LIVER FUNCTIONS - ( 11 Sep 2022 08:07 )  Alb: 4.4 g/dL / Pro: 6.5 g/dL / ALK PHOS: 127 U/L / ALT: 28 U/L / AST: 25 U/L / GGT: x               PT/INR - ( 10 Sep 2022 23:16 )   PT: 11.20 sec;   INR: 0.97 ratio         PTT - ( 12 Sep 2022 06:20 )  PTT:52.7 sec          EKG - SR, nonspecific changes (my read)  Chart and consultant noted personally reviewed.  Care Discussed with Consultants/Other Providers/ Housestaff [ x] YES [ ] NO   Radiology, labs, old records personally reviewed.           DIANE NICOLAS  67y  Female    Patient is a 67y old  Female who presents with a chief complaint of R/o Ischemic Stroke in setting of New Onset Afib (12 Sep 2022 07:30)      HPI:  66 yo RHF with pmhx of Hyperthyroidism , mrankin score of 0 at baseline, presents with left lower facial droop and numbness since 11am today that occurred at work. She denies any ear pain, foreign-body sensation in her eye, loss of taste , fever chills, dizziness, speech deficits , dysphagia or focal extremity weakness and no previous h/o stroke.  Pt does endorse hx of palpitations that started today. EKG in the ED showed new onset afib  (10 Sep 2022 03:24)    S: Patient was examined and seen at bedside. This morning pt is resting comfortably in bed and reports no new issues or overnight events. No complaints, feels better  Denies CP, SOB, N/V/D/C/AP, cough, F, chills, dizziness, new focal weakness, HA, vision changes, dysuria, or urinary symptoms, blood in stool.  ROS: all other systems reviewed and are negative    PAST MEDICAL & SURGICAL HISTORY:  No pertinent past medical history        SOCIAL HISTORY:  Tobacco: denies  Illicit drugs: denies  Alcohol: social  Family history reviewed and otherwise non-contributory No clotting disorders, CVAs at early age.  ALLERGIES: NKDA    MEDICATIONS  (STANDING):  artificial  tears Solution 1 Drop(s) Left EYE every 3 hours  atorvastatin 80 milliGRAM(s) Oral at bedtime  heparin  Infusion. 1000 Unit(s)/Hr (10 mL/Hr) IV Continuous <Continuous>  methimazole 10 milliGRAM(s) Oral daily  metoprolol tartrate 25 milliGRAM(s) Oral two times a day  pantoprazole    Tablet 40 milliGRAM(s) Oral before breakfast  valACYclovir 1000 milliGRAM(s) Oral three times a day    MEDICATIONS  (PRN):  acetaminophen     Tablet .. 650 milliGRAM(s) Oral every 6 hours PRN Temp greater or equal to 38C (100.4F), Mild Pain (1 - 3)  aluminum hydroxide/magnesium hydroxide/simethicone Suspension 30 milliLiter(s) Oral every 4 hours PRN Dyspepsia  LORazepam     Tablet 2 milliGRAM(s) Oral once PRN Anxiety  melatonin 3 milliGRAM(s) Oral at bedtime PRN Insomnia  metoprolol tartrate Injectable 2.5 milliGRAM(s) IV Push every 4 hours PRN Sustained RVR > 120  ondansetron Injectable 4 milliGRAM(s) IV Push every 8 hours PRN Nausea and/or Vomiting    Home Medications:  methIMAzole 5 mg oral tablet: 1 tab(s) orally 2 times a day (10 Sep 2022 03:27)          Vital Signs Last 24 Hrs  T(C): 36.9 (12 Sep 2022 04:07), Max: 36.9 (12 Sep 2022 04:07)  T(F): 98.4 (12 Sep 2022 04:07), Max: 98.4 (12 Sep 2022 04:07)  HR: 79 (12 Sep 2022 04:07) (72 - 100)  BP: 138/78 (12 Sep 2022 04:07) (110/59 - 161/82)  BP(mean): 114 (12 Sep 2022 04:07) (78 - 127)  RR: 18 (12 Sep 2022 04:07) (18 - 20)  SpO2: 99% (12 Sep 2022 04:07) (98% - 99%)    Parameters below as of 12 Sep 2022 04:07  Patient On (Oxygen Delivery Method): room air      CAPILLARY BLOOD GLUCOSE          General: NAD. Looks stated age. Lt facial palsy, unable to puff cheek on Lt or close Lt eye completely  HEENT: clean oropharynx, EOMI, no LAD  Neck: trachea midline, no thyromegaly  CV: nl S1 S2; no m/r/g  Resp: decreased breath sounds at base  GI: NT/ND/S +BS  MS: no clubbing/cyanosis/edema, +pulses  Neuro: motor, sensory intact; + reflexes  Skin: no rashes, nl turgor  Psychiatric: AA0x3 w/ intact insight and judgement    tele: Aflutter nonspecific changes (on my own evaluation of tele monitor)        LABS:                        13.8   5.97  )-----------( 243      ( 12 Sep 2022 06:20 )             39.9     09-11    141  |  104  |  11  ----------------------------<  129<H>  4.2   |  24  |  0.6<L>    Ca    9.3      11 Sep 2022 08:07    TPro  6.5  /  Alb  4.4  /  TBili  1.0  /  DBili  x   /  AST  25  /  ALT  28  /  AlkPhos  127<H>  09-11    LIVER FUNCTIONS - ( 11 Sep 2022 08:07 )  Alb: 4.4 g/dL / Pro: 6.5 g/dL / ALK PHOS: 127 U/L / ALT: 28 U/L / AST: 25 U/L / GGT: x               PT/INR - ( 10 Sep 2022 23:16 )   PT: 11.20 sec;   INR: 0.97 ratio         PTT - ( 12 Sep 2022 06:20 )  PTT:52.7 sec          EKG - SR, nonspecific changes (my read)  Chart and consultant noted personally reviewed.  Care Discussed with Consultants/Other Providers/ Housestaff [ x] YES [ ] NO   Radiology, labs, old records personally reviewed.

## 2022-09-12 NOTE — DISCHARGE NOTE NURSING/CASE MANAGEMENT/SOCIAL WORK - PATIENT PORTAL LINK FT
You can access the FollowMyHealth Patient Portal offered by [facility] by registering at the following website: [email or web address]. By joining [employer]’s FollowMyHealth portal, you will also be able to view your health information using other applications (apps) compatible with our system.

## 2022-09-12 NOTE — CONSULT NOTE ADULT - ASSESSMENT
This is a 66 yo RHF with pmhx significant for Hyperthyroidism on Methimazole presented to the ED for left lower facial droop and numbness on 9/9. CT head was negative for any acute intracranial pathology and CTA/CTP showed no evidence of LVO or perfusion deficit. Patient was not a candidate for tPA as was out of window. In the ED patient was found to have new onset Afib on EKG and was admitted to stroke unit for further management and work up.    #[place] Palsy   - Left sided facial hemiparesis persisted since admission  - CTA/CTP negative for any Acute ICH, no evidence of LVO or perfusion deficit  - CTA/CTP Incidental finding of inferiorly projecting 2 mm vascular outpouching in the right carotid terminus could reflect a vascular infundibulum versus aneurysm  - Echo (9/11) - EF 67%, No PFO, Normal left ventricular systolic function  - Valcyclovir 1000mg three times a day was started on 9/11, add prednisone for [place] palsy management  - c/w Valcyclovir 1000mg TID  - MRI brain wo showed no evidence of acute ischemic event  - SBP goal 120-180    - c/w eye drops/lubricants to prevent corneal abrasion  - c/w using eye cover at night and when outside  -outpt ophtho  -pt counselled       #New onset afib   - start Eliquis 5 BID as ROSALINDA-VASC is 2 (pt aware of risks/benefits and agrees)  -start Toprol 50 BID  - counselled pt to f/u w/ cardio    #Hyperthyroid  -Home med: Methimazole 5mg BID  - counselled pt to f/u w/ endo    Counselled pt/ about follow up, meds, etc. Pt/ verbalized understanding._    My note supersedes the residents note should a discrepancy arise.    Chart and notes personally reviewed.  Care Discussed with Consultants/Other Providers/ Housestaff [ x] YES [ ] NO   Radiology, labs, old records personally reviewed.    discussed w/ housestaff, nursing, case management, neuro team,    This is a 66 yo RHF with pmhx significant for Hyperthyroidism on Methimazole presented to the ED for left lower facial droop and numbness on 9/9. CT head was negative for any acute intracranial pathology and CTA/CTP showed no evidence of LVO or perfusion deficit. Patient was not a candidate for tPA as was out of window. In the ED patient was found to have new onset Afib on EKG and was admitted to stroke unit for further management and work up.    #[place] Palsy   - Left sided facial hemiparesis persisted since admission  - CTA/CTP negative for any Acute ICH, no evidence of LVO or perfusion deficit  - CTA/CTP Incidental finding of inferiorly projecting 2 mm vascular outpouching in the right carotid terminus could reflect a vascular infundibulum versus aneurysm  - Echo (9/11) - EF 67%, No PFO, Normal left ventricular systolic function  - Valcyclovir 1000mg three times a day was started on 9/11, add prednisone for [place] palsy management  - c/w Valcyclovir 1000mg TID  - MRI brain wo showed no evidence of acute ischemic event  - SBP goal 120-180    - c/w eye drops/lubricants to prevent corneal abrasion  - c/w using eye cover at night and when outside  -outpt ophtho  -pt counselled       #New onset afib   - start Eliquis 5 BID as ROSALINDA-VASC is 2 (pt aware of risks/benefits and agrees)  -start Toprol 50 BID  - counselled pt to f/u w/ cardio    #Hyperthyroid  -Home med: Methimazole 5mg BID  - counselled pt to f/u w/ endo    #Obesity/Pre-DM  -wt loss    Counselled pt/ about follow up, meds, etc. Pt/ verbalized understanding._    My note supersedes the residents note should a discrepancy arise.    Chart and notes personally reviewed.  Care Discussed with Consultants/Other Providers/ Housestaff [ x] YES [ ] NO   Radiology, labs, old records personally reviewed.    discussed w/ housestaff, nursing, case management, neuro team,

## 2022-09-15 PROBLEM — Z00.00 ENCOUNTER FOR PREVENTIVE HEALTH EXAMINATION: Status: ACTIVE | Noted: 2022-09-15

## 2022-09-16 DIAGNOSIS — Z20.822 CONTACT WITH AND (SUSPECTED) EXPOSURE TO COVID-19: ICD-10-CM

## 2022-09-16 DIAGNOSIS — E05.90 THYROTOXICOSIS, UNSPECIFIED WITHOUT THYROTOXIC CRISIS OR STORM: ICD-10-CM

## 2022-09-16 DIAGNOSIS — E66.9 OBESITY, UNSPECIFIED: ICD-10-CM

## 2022-09-16 DIAGNOSIS — I48.92 UNSPECIFIED ATRIAL FLUTTER: ICD-10-CM

## 2022-09-16 DIAGNOSIS — I48.0 PAROXYSMAL ATRIAL FIBRILLATION: ICD-10-CM

## 2022-09-16 DIAGNOSIS — E11.9 TYPE 2 DIABETES MELLITUS WITHOUT COMPLICATIONS: ICD-10-CM

## 2022-09-16 DIAGNOSIS — G51.0 BELL'S PALSY: ICD-10-CM

## 2022-09-21 ENCOUNTER — APPOINTMENT (OUTPATIENT)
Dept: OPHTHALMOLOGY | Facility: CLINIC | Age: 67
End: 2022-09-21

## 2022-10-01 NOTE — DISCHARGE NOTE PROVIDER - NSDCFUADDAPPT_GEN_ALL_CORE_FT
1. Please contact the Neurology and Cardiology clinic to make appointment to be seen.   2. The eye clinic will contact you to make an eye appointment.   3. Please follow up with your primary care doctor regarding your thyroid function
42 yo female presents to the ED c/o chest pain, generalized nausea, and back pain. Pt recently stopped thyroid meds due to levels being too high, states had recent cardiac work-up 2 days ago. denies fever. denies HA or neck pain. no sob. no abd pain. no v/d. no urinary f/u/d. no back pain. no motor or sensory deficits. denies illicit drug use. no recent travel. no rash. no other acute issues symptoms or concerns